# Patient Record
Sex: FEMALE | Race: OTHER | Employment: UNEMPLOYED
[De-identification: names, ages, dates, MRNs, and addresses within clinical notes are randomized per-mention and may not be internally consistent; named-entity substitution may affect disease eponyms.]

---

## 2024-07-23 ENCOUNTER — APPOINTMENT (OUTPATIENT)
Facility: HOSPITAL | Age: 29
End: 2024-07-23

## 2024-07-23 ENCOUNTER — HOSPITAL ENCOUNTER (EMERGENCY)
Facility: HOSPITAL | Age: 29
Discharge: HOME | End: 2024-07-23
Attending: EMERGENCY MEDICINE

## 2024-07-23 VITALS
DIASTOLIC BLOOD PRESSURE: 72 MMHG | RESPIRATION RATE: 16 BRPM | TEMPERATURE: 98.5 F | WEIGHT: 182.1 LBS | OXYGEN SATURATION: 100 % | SYSTOLIC BLOOD PRESSURE: 124 MMHG | HEART RATE: 88 BPM

## 2024-07-23 DIAGNOSIS — Z3A.01 7 WEEKS GESTATION OF PREGNANCY: Primary | ICD-10-CM

## 2024-07-23 DIAGNOSIS — R82.71 ASYMPTOMATIC BACTERIURIA: ICD-10-CM

## 2024-07-23 DIAGNOSIS — R11.0 NAUSEA: ICD-10-CM

## 2024-07-23 LAB
ALBUMIN SERPL-MCNC: 4 G/DL (ref 3.5–5)
ALBUMIN/GLOB SERPL: 0.9 (ref 1.1–2.2)
ALP SERPL-CCNC: 98 U/L (ref 45–117)
ALT SERPL-CCNC: 20 U/L (ref 12–78)
ANION GAP SERPL CALC-SCNC: 7 MMOL/L (ref 5–15)
APPEARANCE UR: ABNORMAL
AST SERPL-CCNC: 19 U/L (ref 15–37)
BACTERIA URNS QL MICRO: ABNORMAL /HPF
BASOPHILS # BLD: 0.1 K/UL (ref 0–0.1)
BASOPHILS NFR BLD: 1 % (ref 0–1)
BILIRUB SERPL-MCNC: 0.8 MG/DL (ref 0.2–1)
BILIRUB UR QL CFM: NEGATIVE
BUN SERPL-MCNC: 12 MG/DL (ref 6–20)
BUN/CREAT SERPL: 24 (ref 12–20)
CALCIUM SERPL-MCNC: 10 MG/DL (ref 8.5–10.1)
CHLORIDE SERPL-SCNC: 103 MMOL/L (ref 97–108)
CO2 SERPL-SCNC: 23 MMOL/L (ref 21–32)
COLOR UR: ABNORMAL
COMMENT:: NORMAL
CREAT SERPL-MCNC: 0.49 MG/DL (ref 0.55–1.02)
CRL: 1.02 CM
CRL: 1.06 CM
CRL: 1.08 CM
CRL: 1.11 CM
CRL: 1.12 CM
CRL: 1.14 CM
CRL: 1.26 CM
CRL: 1.4 CM
DIFFERENTIAL METHOD BLD: ABNORMAL
EOSINOPHIL # BLD: 0.1 K/UL (ref 0–0.4)
EOSINOPHIL NFR BLD: 1 % (ref 0–7)
EPITH CASTS URNS QL MICRO: ABNORMAL /LPF
ERYTHROCYTE [DISTWIDTH] IN BLOOD BY AUTOMATED COUNT: 14.4 % (ref 11.5–14.5)
FLUAV RNA SPEC QL NAA+PROBE: NOT DETECTED
FLUBV RNA SPEC QL NAA+PROBE: NOT DETECTED
GLOBULIN SER CALC-MCNC: 4.5 G/DL (ref 2–4)
GLUCOSE SERPL-MCNC: 116 MG/DL (ref 65–100)
GLUCOSE UR STRIP.AUTO-MCNC: NEGATIVE MG/DL
HCG SERPL-ACNC: ABNORMAL MIU/ML (ref 0–6)
HCG UR QL: POSITIVE
HCT VFR BLD AUTO: 39 % (ref 35–47)
HGB BLD-MCNC: 12.3 G/DL (ref 11.5–16)
HGB UR QL STRIP: NEGATIVE
IMM GRANULOCYTES # BLD AUTO: 0 K/UL (ref 0–0.04)
IMM GRANULOCYTES NFR BLD AUTO: 0 % (ref 0–0.5)
KETONES UR QL STRIP.AUTO: >80 MG/DL
LEUKOCYTE ESTERASE UR QL STRIP.AUTO: ABNORMAL
LIPASE SERPL-CCNC: 26 U/L (ref 13–75)
LYMPHOCYTES # BLD: 2.4 K/UL (ref 0.8–3.5)
LYMPHOCYTES NFR BLD: 23 % (ref 12–49)
MAGNESIUM SERPL-MCNC: 2.1 MG/DL (ref 1.6–2.4)
MCH RBC QN AUTO: 23.1 PG (ref 26–34)
MCHC RBC AUTO-ENTMCNC: 31.5 G/DL (ref 30–36.5)
MCV RBC AUTO: 73.2 FL (ref 80–99)
MONOCYTES # BLD: 0.6 K/UL (ref 0–1)
MONOCYTES NFR BLD: 6 % (ref 5–13)
MUCOUS THREADS URNS QL MICRO: ABNORMAL /LPF
NEUTS SEG # BLD: 7.2 K/UL (ref 1.8–8)
NEUTS SEG NFR BLD: 69 % (ref 32–75)
NITRITE UR QL STRIP.AUTO: NEGATIVE
NRBC # BLD: 0 K/UL (ref 0–0.01)
NRBC BLD-RTO: 0 PER 100 WBC
PH UR STRIP: 5.5 (ref 5–8)
PLATELET # BLD AUTO: 332 K/UL (ref 150–400)
PMV BLD AUTO: 11.4 FL (ref 8.9–12.9)
POTASSIUM SERPL-SCNC: 3.8 MMOL/L (ref 3.5–5.1)
PROT SERPL-MCNC: 8.5 G/DL (ref 6.4–8.2)
PROT UR STRIP-MCNC: 30 MG/DL
RBC # BLD AUTO: 5.33 M/UL (ref 3.8–5.2)
RBC #/AREA URNS HPF: ABNORMAL /HPF (ref 0–5)
SAC DIAMETER: 2.71 CM
SAC DIAMETER: 2.72 CM
SAC DIAMETER: 2.72 CM
SARS-COV-2 RNA RESP QL NAA+PROBE: NOT DETECTED
SODIUM SERPL-SCNC: 133 MMOL/L (ref 136–145)
SP GR UR REFRACTOMETRY: >1.03
SPECIMEN HOLD: NORMAL
SPECIMEN HOLD: NORMAL
UROBILINOGEN UR QL STRIP.AUTO: 1 EU/DL (ref 0.2–1)
WBC # BLD AUTO: 10.4 K/UL (ref 3.6–11)
WBC URNS QL MICRO: ABNORMAL /HPF (ref 0–4)

## 2024-07-23 PROCEDURE — 99284 EMERGENCY DEPT VISIT MOD MDM: CPT

## 2024-07-23 PROCEDURE — 96374 THER/PROPH/DIAG INJ IV PUSH: CPT

## 2024-07-23 PROCEDURE — 76817 TRANSVAGINAL US OBSTETRIC: CPT

## 2024-07-23 PROCEDURE — 76801 OB US < 14 WKS SINGLE FETUS: CPT

## 2024-07-23 PROCEDURE — 84702 CHORIONIC GONADOTROPIN TEST: CPT

## 2024-07-23 PROCEDURE — 80053 COMPREHEN METABOLIC PANEL: CPT

## 2024-07-23 PROCEDURE — 83735 ASSAY OF MAGNESIUM: CPT

## 2024-07-23 PROCEDURE — 83690 ASSAY OF LIPASE: CPT

## 2024-07-23 PROCEDURE — 87086 URINE CULTURE/COLONY COUNT: CPT

## 2024-07-23 PROCEDURE — 2580000003 HC RX 258

## 2024-07-23 PROCEDURE — 85025 COMPLETE CBC W/AUTO DIFF WBC: CPT

## 2024-07-23 PROCEDURE — 96361 HYDRATE IV INFUSION ADD-ON: CPT

## 2024-07-23 PROCEDURE — 36415 COLL VENOUS BLD VENIPUNCTURE: CPT

## 2024-07-23 PROCEDURE — 6360000002 HC RX W HCPCS

## 2024-07-23 PROCEDURE — 81025 URINE PREGNANCY TEST: CPT

## 2024-07-23 PROCEDURE — 81001 URINALYSIS AUTO W/SCOPE: CPT

## 2024-07-23 PROCEDURE — 87636 SARSCOV2 & INF A&B AMP PRB: CPT

## 2024-07-23 RX ORDER — ONDANSETRON 4 MG/1
4 TABLET, ORALLY DISINTEGRATING ORAL 3 TIMES DAILY PRN
Qty: 21 TABLET | Refills: 0 | Status: SHIPPED | OUTPATIENT
Start: 2024-07-23

## 2024-07-23 RX ORDER — ONDANSETRON 4 MG/1
4 TABLET, ORALLY DISINTEGRATING ORAL 3 TIMES DAILY PRN
Qty: 21 TABLET | Refills: 0 | Status: CANCELLED | OUTPATIENT
Start: 2024-07-23

## 2024-07-23 RX ORDER — 0.9 % SODIUM CHLORIDE 0.9 %
1000 INTRAVENOUS SOLUTION INTRAVENOUS ONCE
Status: COMPLETED | OUTPATIENT
Start: 2024-07-23 | End: 2024-07-23

## 2024-07-23 RX ORDER — CEPHALEXIN 500 MG/1
500 CAPSULE ORAL 3 TIMES DAILY
Qty: 21 CAPSULE | Refills: 0 | Status: CANCELLED | OUTPATIENT
Start: 2024-07-23 | End: 2024-07-30

## 2024-07-23 RX ORDER — CEPHALEXIN 500 MG/1
500 CAPSULE ORAL 3 TIMES DAILY
Qty: 21 CAPSULE | Refills: 0 | Status: SHIPPED | OUTPATIENT
Start: 2024-07-23 | End: 2024-07-30

## 2024-07-23 RX ORDER — ONDANSETRON 2 MG/ML
4 INJECTION INTRAMUSCULAR; INTRAVENOUS ONCE
Status: COMPLETED | OUTPATIENT
Start: 2024-07-23 | End: 2024-07-23

## 2024-07-23 RX ORDER — KETOROLAC TROMETHAMINE 30 MG/ML
15 INJECTION, SOLUTION INTRAMUSCULAR; INTRAVENOUS ONCE
Status: DISCONTINUED | OUTPATIENT
Start: 2024-07-23 | End: 2024-07-23

## 2024-07-23 RX ADMIN — ONDANSETRON 4 MG: 2 INJECTION INTRAMUSCULAR; INTRAVENOUS at 10:31

## 2024-07-23 RX ADMIN — SODIUM CHLORIDE 1000 ML: 9 INJECTION, SOLUTION INTRAVENOUS at 10:30

## 2024-07-23 ASSESSMENT — ENCOUNTER SYMPTOMS
NAUSEA: 1
VOMITING: 1

## 2024-07-23 NOTE — ED TRIAGE NOTES
Triage done with [facility]- pt c/o fever, vomiting and inability to get out of bed x 3 days, subjective fever, +abd pain, denies diarrhea or constipation , stated she may be pregnant

## 2024-07-23 NOTE — DISCHARGE INSTRUCTIONS
You were seen today in the emergency department for abdominal pain and nausea.  Your pregnancy test was positive in the emergency department.  According to the ultrasound imaging obtained today you are approximately 7 weeks gestation.  Your urine also showed evidence of bacteria.  Due to you being pregnant you should take the antibiotic as prescribed to treat the bacteria in your urine.  You need to follow-up closely with an OB/GYN for further evaluation and ongoing care during your pregnancy.  Return to the emergency department for any new or worsening symptoms.

## 2024-07-23 NOTE — ED PROVIDER NOTES
[id number]  [phone]    Schedule an appointment as soon as possible for a visit       [facility] EMERGENCY DEP  [address] [facility] [address]  [phone]  Go to   If symptoms worsen      DISCHARGE MEDICATIONS:  Discharge Medication List as of 7/23/2024  1:40 PM        START taking these medications    Details   cephALEXin (KEFLEX) 500 MG capsule Take 1 capsule by mouth 3 times daily for 7 days, Disp-21 capsule, R-0Print      ondansetron (ZOFRAN-ODT) 4 MG disintegrating tablet Take 1 tablet by mouth 3 times daily as needed for Nausea or Vomiting, Disp-21 tablet, R-0Print               (Please note that portions of this note were completed with a voice recognition program.  Efforts were made to edit the dictations but occasionally words are mis-transcribed.)    Bertha Wayne PA-C (electronically signed)  Emergency Attending Physician / Physician Assistant / Nurse Practitioner             Bertha Wayne PA-C  07/23/24 3807

## 2024-07-24 LAB
BACTERIA SPEC CULT: NORMAL
CC UR VC: NORMAL
SERVICE CMNT-IMP: NORMAL

## 2024-08-02 ENCOUNTER — PROCEDURE VISIT (OUTPATIENT)
Age: 29
End: 2024-08-02

## 2024-08-02 ENCOUNTER — INITIAL PRENATAL (OUTPATIENT)
Age: 29
End: 2024-08-02

## 2024-08-02 VITALS — WEIGHT: 173 LBS

## 2024-08-02 DIAGNOSIS — Z34.01 ENCOUNTER FOR SUPERVISION OF NORMAL FIRST PREGNANCY IN FIRST TRIMESTER: Primary | ICD-10-CM

## 2024-08-02 DIAGNOSIS — O36.80X0 ULTRASOUND SCAN TO CONFIRM FETAL VIABILITY WITH HISTORY OF MISCARRIAGE: Primary | ICD-10-CM

## 2024-08-02 DIAGNOSIS — Z34.90 PREGNANCY, UNSPECIFIED GESTATIONAL AGE: ICD-10-CM

## 2024-08-02 DIAGNOSIS — Z87.59 ULTRASOUND SCAN TO CONFIRM FETAL VIABILITY WITH HISTORY OF MISCARRIAGE: Primary | ICD-10-CM

## 2024-08-02 RX ORDER — ONDANSETRON 4 MG/1
4 TABLET, ORALLY DISINTEGRATING ORAL 3 TIMES DAILY PRN
Qty: 20 TABLET | Refills: 3 | Status: SHIPPED | OUTPATIENT
Start: 2024-08-02

## 2024-08-02 RX ORDER — PROMETHAZINE HYDROCHLORIDE 25 MG/1
25 SUPPOSITORY RECTAL EVERY 6 HOURS PRN
Qty: 20 SUPPOSITORY | Refills: 2 | Status: SHIPPED | OUTPATIENT
Start: 2024-08-02

## 2024-08-02 NOTE — PROGRESS NOTES
Initial Prenatal Note    CC: Amenorrhea, positive pregnancy test    HPI:  Bri Trent is a 29 y.o.  who presents for initial prenatal appointment. She was breastfeeding and was unsure she was pregnant until she went to the hospital due to nausea and vomiting. She has experienced hyperemesis. She denies dysuria, discharge, vaginal bleeding.    LMP history:  The date of her LMP is 23.  Her last menstrual period was normal.     Based on her LMP, her EDC is 2025 and her EGA is 9 weeks,1 days.     Ultrasound data:  She had an  ultrasound done by the ultrasound tech today which revealed a viable lopez pregnancy with a gestational age of 9 weeks and 1 days giving an EDC of 2025.  TA ULTRASOUND PERFORMED A SINGLE VIABLE 9W1D WITH BASIL OF 2025 IUP IS SEEN WITH NORMAL CARDIAC RHYTHM. GESTATIONAL AGE BASED ON TODAY'S ULTRASOUND. A NORMAL YOLK SAC IS SEEN. RIGHT OVARY NOT VISUALIZED DUE TO BOWEL/GAS. RIGHT ADDNEXA APPEARS WNL. LEFT OVARY APPEARS WNL. NO FREE FLUID IS SEEN IN THE CDS.   She is dated by US.    Relevant past pregnancy history:  She has the following pregnancy history: 3 c sections   She does not history of  delivery.    Relevant past medical history:   Noncontributory    Last Pap: unsure    Relevant social history:       1. Have you been to the ER, urgent care clinic, or hospitalized since your last visit? Yes, ER due to hyperemesis    2. Have you seen or consulted any other health care providers outside of the [facility] since your last visit? No    She declines  a chaperone during the gynecologic exam today.      Mini Holm LPN  
IUP IS SEEN WITH NORMAL CARDIAC RHYTHM. GESTATIONAL AGE BASED ON TODAY'S ULTRASOUND. A NORMAL YOLK SAC IS SEEN. RIGHT OVARY NOT VISUALIZED DUE TO BOWEL/GAS. RIGHT ADDNEXA APPEARS WNL. LEFT OVARY APPEARS WNL. NO FREE FLUID IS SEEN IN THE CDS.     I personally reviewed all US images as well as the above report in detail today edited as necessary.    Physical Exam:     Constitutional  Appearance: well-nourished, well developed, alert, in no acute distress    HENT  Head  Face: appears normal  Eyes: appear normal  Ears: normal  Mouth: normal  Lips: no lesions      Chest  Respiratory Effort: breathing unlabored     Cardiovascular  Heart:  Auscultation: regular rate and rhythm without murmur      Gastrointestinal  Abdominal Examination: abdomen non-tender to palpation, normal bowel sounds, no masses present  Liver and spleen: no hepatomegaly present, spleen not palpable  Hernias: no hernias identified    Genitourinary  deferred    Skin  General Inspection: no rash, no lesions identified    Neurologic/Psychiatric  Mental Status:  Orientation: grossly oriented to person, place and time  Mood and Affect: mood normal, affect appropriate    Assessment:   Intrauterine pregnancy with issues addressed in problem list  hyperemesis  Plan:   Recommend zofran and phenergan; encouraged ER for IVF; will set up home nursing/ zofran pump Monday; pepcid encouraged  Patient declines presence of chaperone during today's visit.   Offered CF testing, CVS, Nuchal Translucency, MSAFP, amnio, and discussed NIPT  Course of pregnancy discussed including visit schedule, routine U/S, glucola testing, etc.  Avoid alcoholic beverages and illicit/recreational drugs use  Take prenatal vitamins or folic acid daily.  Hospital and practice style discussed with coverage system.  Discussed nutrition, toxoplasmosis precautions, sexual activity, exercise, need for influenza vaccine, environmental and work hazards, travel advice, screen for domestic violence,

## 2024-08-03 LAB
BACTERIA SPEC CULT: NORMAL
SERVICE CMNT-IMP: NORMAL

## 2024-08-06 LAB
C TRACH RRNA SPEC QL NAA+PROBE: NEGATIVE
N GONORRHOEA RRNA SPEC QL NAA+PROBE: NEGATIVE
SPECIMEN SOURCE: NORMAL
T VAGINALIS RRNA SPEC QL NAA+PROBE: NEGATIVE

## 2024-08-23 ENCOUNTER — ROUTINE PRENATAL (OUTPATIENT)
Age: 29
End: 2024-08-23

## 2024-08-23 VITALS — WEIGHT: 175 LBS | DIASTOLIC BLOOD PRESSURE: 64 MMHG | SYSTOLIC BLOOD PRESSURE: 108 MMHG

## 2024-08-23 DIAGNOSIS — Z34.90 PREGNANCY, UNSPECIFIED GESTATIONAL AGE: Primary | ICD-10-CM

## 2024-08-23 NOTE — PROGRESS NOTES
The patient reports ongoing daily dizziness, when changing position and not; admits to mainly drinking pepsi; cautioned regarding this and reviewed need for good po intake     IOB labs today  Desires Panorama/Hor[employer] today  Urine cx/ urine gc,ch,tv done last visit

## 2024-08-24 LAB
ABO + RH BLD: NORMAL
BLOOD BANK CMNT PATIENT-IMP: NORMAL
BLOOD GROUP ANTIBODIES SERPL: NORMAL
ERYTHROCYTE [DISTWIDTH] IN BLOOD BY AUTOMATED COUNT: 15 % (ref 11.5–14.5)
HBV SURFACE AG SER QL: <0.1 INDEX
HBV SURFACE AG SER QL: NEGATIVE
HCT VFR BLD AUTO: 38.3 % (ref 35–47)
HCV AB SER IA-ACNC: 0.05 INDEX
HCV AB SERPL QL IA: NONREACTIVE
HGB BLD-MCNC: 12 G/DL (ref 11.5–16)
HIV 1+2 AB+HIV1 P24 AG SERPL QL IA: NONREACTIVE
HIV 1/2 RESULT COMMENT: NORMAL
MCH RBC QN AUTO: 23.5 PG (ref 26–34)
MCHC RBC AUTO-ENTMCNC: 31.3 G/DL (ref 30–36.5)
MCV RBC AUTO: 75.1 FL (ref 80–99)
NRBC # BLD: 0 K/UL (ref 0–0.01)
NRBC BLD-RTO: 0 PER 100 WBC
PLATELET # BLD AUTO: 315 K/UL (ref 150–400)
PMV BLD AUTO: 12.8 FL (ref 8.9–12.9)
RBC # BLD AUTO: 5.1 M/UL (ref 3.8–5.2)
RUBV IGG SERPL IA-ACNC: NORMAL IU/ML
SPECIMEN EXP DATE BLD: NORMAL
WBC # BLD AUTO: 10.4 K/UL (ref 3.6–11)

## 2024-08-26 LAB
T PALLIDUM AB SER QL IA: NON REACTIVE
VZV IGG SER IA-ACNC: 184 INDEX

## 2024-08-27 LAB
HGB A MFR BLD: 97.3 % (ref 96.4–98.8)
HGB A2 MFR BLD COLUMN CHROM: 2.7 % (ref 1.8–3.2)
HGB F MFR BLD: 0 % (ref 0–2)
HGB FRACT BLD-IMP: NORMAL
HGB S MFR BLD: 0 %

## 2024-09-02 LAB
Lab: NEGATIVE
Lab: NORMAL
NTRA CYSTIC FIBROSIS: NEGATIVE
NTRA DUCHENNE/BECKER MUSCULAR DYSTROPHY: NEGATIVE
NTRA FRAGILE X SYNDROME: NEGATIVE
NTRA SPINAL MUSCULAR ATROPHY: NEGATIVE

## 2024-09-03 ENCOUNTER — TELEPHONE (OUTPATIENT)
Age: 29
End: 2024-09-03

## 2024-09-03 NOTE — TELEPHONE ENCOUNTER
Patient presented to the office today. Lab work reviewed with patient in the office. Patient has no questions at this time.

## 2024-09-03 NOTE — TELEPHONE ENCOUNTER
Per Dr. Garibay- attempted to reach patient today to review lab work results. Patients phone number is going straight to voicemail and her voicemail is not set up to be able to leave a message. If patient calls back, please review her most recent lab work results.     Blue sticky note updated for Dr. Garibay to update the problem list.

## 2024-09-17 ENCOUNTER — ROUTINE PRENATAL (OUTPATIENT)
Age: 29
End: 2024-09-17

## 2024-09-17 VITALS
OXYGEN SATURATION: 99 % | BODY MASS INDEX: 28.99 KG/M2 | WEIGHT: 174 LBS | HEART RATE: 95 BPM | SYSTOLIC BLOOD PRESSURE: 109 MMHG | DIASTOLIC BLOOD PRESSURE: 74 MMHG | HEIGHT: 65 IN

## 2024-09-17 DIAGNOSIS — Z34.01 ENCOUNTER FOR SUPERVISION OF NORMAL FIRST PREGNANCY IN FIRST TRIMESTER: ICD-10-CM

## 2024-09-17 DIAGNOSIS — Z34.90 PREGNANCY, UNSPECIFIED GESTATIONAL AGE: Primary | ICD-10-CM

## 2024-09-17 PROCEDURE — 0502F SUBSEQUENT PRENATAL CARE: CPT | Performed by: OBSTETRICS & GYNECOLOGY

## 2024-09-17 RX ORDER — ONDANSETRON 4 MG/1
4 TABLET, ORALLY DISINTEGRATING ORAL 3 TIMES DAILY PRN
Qty: 20 TABLET | Refills: 3 | Status: SHIPPED | OUTPATIENT
Start: 2024-09-17

## 2024-09-21 LAB
AFP INTERP SERPL-IMP: NORMAL
AFP MOM SERPL: 0.83
AFP SERPL-MCNC: 24.1 NG/ML
AGE AT DELIVERY: 29.9 YR
AGE OF EGG DONOR: NO
AGE OF EGG DONOR: NORMAL
COMMENT: NORMAL
DONOR EGG?: 5
DONOR EGG?: NO
FAMILY HISTORY NTD: NORMAL
FHX NTD (Y OR N): NORMAL
GA METHOD: NORMAL
GA: 15.7 WEEKS
IDDM PATIENT QL: NO
INSULIN DEP. DIABETIC: 2784
Lab: 174
Lab: NORMAL
Lab: NORMAL
MAT SCN FOR FETAL ABNORMALITIES SERPL: NORMAL
MULTIPLE PREGNANCY: NO
NEURAL TUBE DEFECT RISK FETUS: NORMAL
NUMBER OF FETUSES: NO
OTHER INDICATIONS: NO
OTHER INDICATIONS: NORMAL
PREVIOUSLY ELEVATED AFP (Y OR N): 15
PREVIOUSLY ELEVATED AFP (Y OR N): NO
PRIOR 1ST TRIM TESTING ?: NORMAL
PRIOR 2ND TRIM TESTING ?: NORMAL
PRIOR DS/NTD SCREEN CURRENT PREGNANCY?: NORMAL
PRIOR DS/NTD SCREEN CURRENT PREGNANCY?: NORMAL
PRIOR FIRST TRIMESTER TESTING (Y OR N ): NORMAL
PRIOR PREGNANCY WITH DOWN SYNDROME (Y OR N): 1
PRIOR PREGNANCY WITH DOWN SYNDROME (Y OR N): NORMAL
TYPE OF EGG DONOR: NO
TYPE OF EGG DONOR: NORMAL

## 2024-10-14 ENCOUNTER — TELEPHONE (OUTPATIENT)
Age: 29
End: 2024-10-14

## 2024-10-14 DIAGNOSIS — Z34.90 PREGNANCY, UNSPECIFIED GESTATIONAL AGE: Primary | ICD-10-CM

## 2024-10-14 NOTE — PROGRESS NOTES
Verbal order obtained from Leonila Garibay MD for anatomy scan  and a diagnosis of normal pregnancy. Order read back to MD. Orders signed by this writer and sent for co-sign to MD.

## 2024-10-15 ENCOUNTER — ROUTINE PRENATAL (OUTPATIENT)
Age: 29
End: 2024-10-15

## 2024-10-15 VITALS
BODY MASS INDEX: 30.36 KG/M2 | DIASTOLIC BLOOD PRESSURE: 71 MMHG | WEIGHT: 180 LBS | HEART RATE: 89 BPM | SYSTOLIC BLOOD PRESSURE: 110 MMHG

## 2024-10-15 DIAGNOSIS — Z34.90 PREGNANCY, UNSPECIFIED GESTATIONAL AGE: Primary | ICD-10-CM

## 2024-10-15 PROCEDURE — 0502F SUBSEQUENT PRENATAL CARE: CPT | Performed by: OBSTETRICS & GYNECOLOGY

## 2024-11-12 ENCOUNTER — ROUTINE PRENATAL (OUTPATIENT)
Age: 29
End: 2024-11-12

## 2024-11-12 VITALS
WEIGHT: 186 LBS | OXYGEN SATURATION: 98 % | BODY MASS INDEX: 31.37 KG/M2 | SYSTOLIC BLOOD PRESSURE: 125 MMHG | HEART RATE: 96 BPM | DIASTOLIC BLOOD PRESSURE: 80 MMHG

## 2024-11-12 DIAGNOSIS — Z34.82 PRENATAL CARE, SUBSEQUENT PREGNANCY IN SECOND TRIMESTER: Primary | ICD-10-CM

## 2024-11-12 DIAGNOSIS — Z34.90 PREGNANCY, UNSPECIFIED GESTATIONAL AGE: ICD-10-CM

## 2024-11-12 DIAGNOSIS — Z34.82 PRENATAL CARE, SUBSEQUENT PREGNANCY IN SECOND TRIMESTER: ICD-10-CM

## 2024-11-12 LAB — GLUCOSE 1H P 100 G GLC PO SERPL-MCNC: 165 MG/DL (ref 65–140)

## 2024-11-12 PROCEDURE — 0502F SUBSEQUENT PRENATAL CARE: CPT | Performed by: OBSTETRICS & GYNECOLOGY

## 2024-11-12 NOTE — PROGRESS NOTES
Using trusted   Couple share that she was diabetic in last pregnancy  Glucola now; reviewed management if abnormal testing

## 2024-11-15 ENCOUNTER — TELEPHONE (OUTPATIENT)
Age: 29
End: 2024-11-15

## 2024-11-15 NOTE — TELEPHONE ENCOUNTER
I have attempted to contact the pt in regards to her most recent labs. Phone rung one time and there was a message that the voice mail had not been set up.     With the  we attempted this call twice. Will call her again later today.

## 2024-12-06 ENCOUNTER — TELEPHONE (OUTPATIENT)
Age: 29
End: 2024-12-06

## 2024-12-06 DIAGNOSIS — Z34.90 PREGNANCY, UNSPECIFIED GESTATIONAL AGE: Primary | ICD-10-CM

## 2024-12-06 NOTE — TELEPHONE ENCOUNTER
Name and  verified. Patient and  notified regarding results. Patient presented with both options from provider and advised would like to do 3 hr glucola. Patient educated on glucola instructions. Patient scheduled for lab with MD follow-up during visit.

## 2024-12-17 ENCOUNTER — ROUTINE PRENATAL (OUTPATIENT)
Age: 29
End: 2024-12-17
Payer: MEDICAID

## 2024-12-17 ENCOUNTER — LAB (OUTPATIENT)
Age: 29
End: 2024-12-17

## 2024-12-17 VITALS
DIASTOLIC BLOOD PRESSURE: 82 MMHG | HEART RATE: 101 BPM | BODY MASS INDEX: 31.54 KG/M2 | WEIGHT: 187 LBS | SYSTOLIC BLOOD PRESSURE: 126 MMHG

## 2024-12-17 DIAGNOSIS — Z34.82 PRENATAL CARE, SUBSEQUENT PREGNANCY IN SECOND TRIMESTER: Primary | ICD-10-CM

## 2024-12-17 DIAGNOSIS — Z34.90 PREGNANCY, UNSPECIFIED GESTATIONAL AGE: ICD-10-CM

## 2024-12-17 LAB
GESTATIONAL 3HR GTT: ABNORMAL
GLUCOSE 1H P 100 G GLC PO SERPL-MCNC: 201 MG/DL (ref 65–180)
GLUCOSE 2 HOUR: 184 MG/DL (ref 65–155)
GLUCOSE P FAST SERPL-MCNC: 109 MG/DL (ref 65–95)
GLUCOSE, 3 HOUR: 126 MG/DL (ref 65–140)

## 2024-12-17 PROCEDURE — 0502F SUBSEQUENT PRENATAL CARE: CPT | Performed by: OBSTETRICS & GYNECOLOGY

## 2024-12-17 PROCEDURE — 90715 TDAP VACCINE 7 YRS/> IM: CPT | Performed by: OBSTETRICS & GYNECOLOGY

## 2024-12-17 PROCEDURE — 90471 IMMUNIZATION ADMIN: CPT | Performed by: OBSTETRICS & GYNECOLOGY

## 2024-12-17 NOTE — PROGRESS NOTES
Using  phone  Doing well; focused on my transition and getting her established with Dr Mosquera  Having her 3hr GTT; very challenging to reach her for earlier evaluation

## 2024-12-18 ENCOUNTER — TELEPHONE (OUTPATIENT)
Age: 29
End: 2024-12-18

## 2025-01-08 ENCOUNTER — ROUTINE PRENATAL (OUTPATIENT)
Age: 30
End: 2025-01-08
Payer: MEDICAID

## 2025-01-08 VITALS
DIASTOLIC BLOOD PRESSURE: 80 MMHG | BODY MASS INDEX: 31.71 KG/M2 | HEART RATE: 100 BPM | WEIGHT: 188 LBS | SYSTOLIC BLOOD PRESSURE: 115 MMHG

## 2025-01-08 DIAGNOSIS — O24.410 DIET CONTROLLED GESTATIONAL DIABETES MELLITUS (GDM), ANTEPARTUM: ICD-10-CM

## 2025-01-08 DIAGNOSIS — Z34.82 PRENATAL CARE, SUBSEQUENT PREGNANCY IN SECOND TRIMESTER: Primary | ICD-10-CM

## 2025-01-08 DIAGNOSIS — Z34.90 PREGNANCY, UNSPECIFIED GESTATIONAL AGE: ICD-10-CM

## 2025-01-08 PROCEDURE — 59025 FETAL NON-STRESS TEST: CPT | Performed by: OBSTETRICS & GYNECOLOGY

## 2025-01-08 PROCEDURE — 0502F SUBSEQUENT PRENATAL CARE: CPT | Performed by: OBSTETRICS & GYNECOLOGY

## 2025-01-08 NOTE — PROGRESS NOTES
Able to use  to review gestational DM; was diabetic in last pregnancy; reviewed accucheck parameters; MFM/ diab counseling appt set for   Decreased FM for past several days  Will be transferring to Dr Mosquera  NST procedure note    Bri Trent is a ,  29 y.o. female Other whose No LMP recorded. Patient is pregnant.  was on  who presents for fetal non-stress test.    She is 31w6d and was monitored for 25 minutes and the FHR was reactive, with accelerations.    NST Interpretation:    FHR baseline 140 bpm, variability moderate, accelerations present, decelerations Absent.    Uterine contractions were absent.    Bri was informed of the NST results and her questions were answered.

## 2025-01-13 ENCOUNTER — ROUTINE PRENATAL (OUTPATIENT)
Age: 30
End: 2025-01-13
Payer: MEDICAID

## 2025-01-13 DIAGNOSIS — O24.419 GESTATIONAL DIABETES MELLITUS (GDM) IN THIRD TRIMESTER, GESTATIONAL DIABETES METHOD OF CONTROL UNSPECIFIED: Primary | ICD-10-CM

## 2025-01-13 DIAGNOSIS — Z86.32 HISTORY OF GESTATIONAL DIABETES IN PRIOR PREGNANCY, CURRENTLY PREGNANT IN THIRD TRIMESTER: ICD-10-CM

## 2025-01-13 DIAGNOSIS — O09.293 HISTORY OF GESTATIONAL DIABETES IN PRIOR PREGNANCY, CURRENTLY PREGNANT IN THIRD TRIMESTER: ICD-10-CM

## 2025-01-13 DIAGNOSIS — O24.419 GDM (GESTATIONAL DIABETES MELLITUS): ICD-10-CM

## 2025-01-13 PROCEDURE — 99214 OFFICE O/P EST MOD 30 MIN: CPT

## 2025-01-13 RX ORDER — GLUCOSAMINE HCL/CHONDROITIN SU 500-400 MG
CAPSULE ORAL
Qty: 200 STRIP | Refills: 3 | Status: SHIPPED | OUTPATIENT
Start: 2025-01-13

## 2025-01-13 RX ORDER — BLOOD-GLUCOSE METER
EACH MISCELLANEOUS
Qty: 1 KIT | Refills: 0 | Status: SHIPPED | OUTPATIENT
Start: 2025-01-13

## 2025-01-13 RX ORDER — PEN NEEDLE, DIABETIC 31 GX5/16"
NEEDLE, DISPOSABLE MISCELLANEOUS
Qty: 200 EACH | Refills: 3 | Status: SHIPPED | OUTPATIENT
Start: 2025-01-13

## 2025-01-13 RX ORDER — LANCETS 30 GAUGE
EACH MISCELLANEOUS
Qty: 200 EACH | Refills: 3 | Status: SHIPPED | OUTPATIENT
Start: 2025-01-13

## 2025-01-13 NOTE — PROGRESS NOTES
Assessment & Plan   ASSESSMENT/PLAN:  1. Gestational diabetes mellitus (GDM) in third trimester, gestational diabetes method of control unspecified  2. History of gestational diabetes in prior pregnancy, currently pregnant in third trimester    Bri is a 30 y/o seen today for a new diagnosis of GDM.    Priest Noel Thomas Translated for the patient.     New GDM/Hx GDM A2  -24: 1 hr gtt: fail 165; 24: 3 hr gtt: fail 109 201 184 126  -We reviewed patient's new diagnosis of gestational diabetes. Discussion with the patient included an overview of gestational diabetes, methods of management, monitoring and surveillance,  implications (i.e., macrosomia as well as increased risk of the patient developing diabetes in the future).  We discussed the importance of good glycemia control and risks of uncontrolled diabetes including but not limited to LGA, polyhydramnios,  labor, and fetal death. Packet with DM education material, blood sugar logs provided to patient.  -We discussed risks/benefits of insulin vs oral agents including unknown long-term effects to the fetus with oral agents and the rare side effect of lactic acidosis with metformin. All questions answered, patient voiced understanding and is amenable to starting insulin if blood sugar is not controlled with diet.   -New GDM education provided. See below.   -Declined diabetes ed. Reports well versed with GDM meal planning.  -Submit logs weekly.   -Kick count instructions, PIH and PTL precautions reviewed.   -Pt aware of MFM U/S appt scheduled     Subjective   Bri Trent (:  1995) is a 29 y.o. female,New patient, here for evaluation of the following chief complaint(s):  1. Gestational diabetes mellitus (GDM) in third trimester, gestational diabetes method of control unspecified  2. History of gestational diabetes in prior pregnancy, currently pregnant in third trimester    Patient has been newly diagnosed with Gestational Diabetes

## 2025-01-23 ENCOUNTER — ROUTINE PRENATAL (OUTPATIENT)
Age: 30
End: 2025-01-23
Payer: MEDICAID

## 2025-01-23 VITALS — DIASTOLIC BLOOD PRESSURE: 63 MMHG | SYSTOLIC BLOOD PRESSURE: 109 MMHG | HEART RATE: 96 BPM

## 2025-01-23 DIAGNOSIS — Z3A.34 34 WEEKS GESTATION OF PREGNANCY: Primary | ICD-10-CM

## 2025-01-23 PROCEDURE — 76811 OB US DETAILED SNGL FETUS: CPT | Performed by: STUDENT IN AN ORGANIZED HEALTH CARE EDUCATION/TRAINING PROGRAM

## 2025-01-23 RX ORDER — INSULIN HUMAN 100 [IU]/ML
INJECTION, SUSPENSION SUBCUTANEOUS
Qty: 5 ADJUSTABLE DOSE PRE-FILLED PEN SYRINGE | Refills: 3 | Status: SHIPPED | OUTPATIENT
Start: 2025-01-23

## 2025-01-23 RX ORDER — UBIQUINOL 100 MG
CAPSULE ORAL
Qty: 200 EACH | Refills: 5 | Status: SHIPPED | OUTPATIENT
Start: 2025-01-23

## 2025-01-23 RX ORDER — INSULIN LISPRO 100 [IU]/ML
INJECTION, SOLUTION INTRAVENOUS; SUBCUTANEOUS
Qty: 5 ADJUSTABLE DOSE PRE-FILLED PEN SYRINGE | Refills: 3 | Status: SHIPPED | OUTPATIENT
Start: 2025-01-23

## 2025-01-23 NOTE — PROGRESS NOTES
Please look under media to view full consult and ultrasound report in ViewPoint.     Bri Trent , was evaluated through a synchronous (real-time) audio encounter.     The patient (or guardian if applicable) is aware that this is a billable service, which includes applicable co-pays. This Virtual Visit was conducted with patient's (and/or legal guardian's) consent. Patient identification was verified, and a caregiver was present when appropriate.     This patient was located at home home [address] [facility] [address].     Provider was located at facility [address] [place] [address].     Confirm you are appropriately licensed, registered, or certified to deliver care in the state where the patient is located as indicated above. If you are not or unsure, please re-schedule the visit: Yes, I confirm.    Yanna Lal MD   Maternal Fetal Medicine

## 2025-01-23 NOTE — PROGRESS NOTES
NP note 1/23/25    Patient declined hospital  services and utilized her  , name Maggi Liang.    Log review with Bri Trent and counseled on initiation of insulin.  Dr. Lal to discuss Anatomy scan.  Please see Viewpoint for ultrasound findings.      Gestational Diabetes: A2  - Previously counseled. See prior notes.   - Recommend diabetes educator consult   - Reviewed importance of BG log and submitting each week to monitor BG (Patient to submit via mychart or in person visit)   - Recommend serial growth q4 weeks from diagnosis with MFM   - Weekly ANT starting at 32 weeks with primary OB   - Delivery between 39.0-39.6 (If poorly controlled with LGA/Poly consider delivery at 37.0-38.6)      Log reviewed: Globally elevated. Denies hypoglycemic episodes.   - NP reviewed risks/benefits of insulin vs oral agents including unknown long-term effects to the fetus with oral agents and the rare side effect of lactic acidosis with metformin. All questions answered, patient voiced understanding and amenable to starting insulin.   - Current regimen: Diet   - New Regimen: NPH 17/13 Lispro 13/0/13  - Insulin administration education provided by KAYDEN.     Sarah Sheth, TRU-BC

## 2025-01-23 NOTE — PROGRESS NOTES
Reviewed insulin administration with patient and patient verbalized and demonstrated understanding. Reviewed signs/symptoms of hypoglycemia. Patient declined hospital  services and utilized her  , angel Liang.

## 2025-01-23 NOTE — PROGRESS NOTES
Transfer from [facility]  She opts to have Father Noel  for her  GDM- insulin prescribed but hasn't started it yet.  She reports a fasting blood sugar of 120  Isn't taking pnv, doesn't feel well after taking it  Tentative RLTCS 3/3 10am; not posted yet per PL.  Per PL desires bilat salping, ethics consult?  Saw M 1/23  +FM  Some 'stiffness' and 'severe' hip and pubic pain  Denies any ctx yet  Her , 3 yo daughter and Father Noel present with her to today's visit  Nanda Barrera LPN  1/24/2025  8:52 AM

## 2025-01-23 NOTE — PROCEDURES
PATIENT: JANAY VELASCO   -  : 1995   -  DOS:2025   -  INTERPRETING PROVIDER:Yanna Lal,   Indication  ========    New GDM    Method  ======    Transabdominal ultrasound examination. View: suboptimal due to advanced gestational age    Dating  ======    Previous Ultrasound on: 2024  Type of prior assessment: GA  GA at prior assessment date 9 w + 0 d  GA by previous U/S 34 w + 0 d  BASIL by previous Ultrasound: 3/6/2025  Ultrasound examination on: 2025  GA by U/S based upon: AC, BPD, Femur, HC  GA by U/S 35 w + 4 d  BASIL by U/S: 2025  Assigned: based on ultrasound (GA), selected on 2025  Assigned GA 34 w + 0 d  Assigned BASIL: 3/6/2025    Fetal Growth Overview  =================    Exam date        GA              BPD (mm)          HC (mm)              AC (mm)               FL (mm)             HL (mm)            EFW (g)  2025        34w 0d        86.2     70%        329.9    93%        323.3     96%        65.3    31%        55.7     17%        2709    85%    Fetal Biometry  ============    Standard  BPD 86.2 mm 34w 5d 70% Hadlock  .7 mm -/- >99% Kenn  .9 mm 37w 4d 93% Hadlock  Cerebellum tr 47.0 mm 35w 3d 59% Hill  .3 mm 36w 2d 96% Hadlock  Femur 65.3 mm 33w 5d 31% Hadlock  Humerus 55.7 mm 32w 3d 17% Kenn  EFW 2,709 g 35w 4d 85% Hadlock  EFW (lb) 6 lb  EFW (oz) 0 oz  EFW by: Hadlock (BPD-HC-AC-FL)  Extended   5.0 mm  CM 8.0 mm  66% Nicolaides  Nasal bone 11.1 mm  Head / Face / Neck  Nasal bone: present  Other Structures   bpm    General Evaluation  ==============    Cardiac activity present.  bpm. Fetal movements: visualized. Presentation: BREECH  Placenta: Placental site: posterior, appropriate distance from the internal os. Placental edge-to-cervical os distance 14.6 cm  Umbilical cord: Cord vessels: 3 vessel cord. Insertion site: central  Amniotic fluid: Amount of AF: normal. MVP 6.2 cm. SHANTAL 22.9 cm. Q1 6.1 cm, Q2 5.0 cm, Q3 5.5 cm, Q4 6.2

## 2025-01-24 ENCOUNTER — ROUTINE PRENATAL (OUTPATIENT)
Age: 30
End: 2025-01-24

## 2025-01-24 VITALS
DIASTOLIC BLOOD PRESSURE: 73 MMHG | BODY MASS INDEX: 32.15 KG/M2 | TEMPERATURE: 97.9 F | HEART RATE: 111 BPM | OXYGEN SATURATION: 98 % | HEIGHT: 65 IN | WEIGHT: 193 LBS | RESPIRATION RATE: 17 BRPM | SYSTOLIC BLOOD PRESSURE: 113 MMHG

## 2025-01-24 DIAGNOSIS — Z34.90 PREGNANCY, UNSPECIFIED GESTATIONAL AGE: Primary | ICD-10-CM

## 2025-01-24 PROCEDURE — 0502F SUBSEQUENT PRENATAL CARE: CPT | Performed by: OBSTETRICS & GYNECOLOGY

## 2025-01-24 ASSESSMENT — PATIENT HEALTH QUESTIONNAIRE - PHQ9
SUM OF ALL RESPONSES TO PHQ QUESTIONS 1-9: 0
2. FEELING DOWN, DEPRESSED OR HOPELESS: NOT AT ALL
SUM OF ALL RESPONSES TO PHQ QUESTIONS 1-9: 0
SUM OF ALL RESPONSES TO PHQ9 QUESTIONS 1 & 2: 0
1. LITTLE INTEREST OR PLEASURE IN DOING THINGS: NOT AT ALL

## 2025-01-24 NOTE — PROGRESS NOTES
Transfer from [facility]  She opts to have Father Noel  for her  GDM- insulin prescribed but hasn't started it yet.  She reports a fasting blood sugar of 120 this morning. Most fastings and postprandials significantly elevated, highs in 200s. Strongly encouraged her to start insulin, discussed LGA and risks for delivery/  Isn't taking pnv, doesn't feel well after taking it  RLTCS and bilat salpingectomy requested 25, ethics consulted, response pending  Saw MFM   +FM, no LOF or VB or ctx  Some 'stiffness' and 'severe' hip and pubic pain/groin pain, discussed pregnancy support belt  Her , 3 yo daughter and Father Noel present with her to today's visit    RTC 2 wks    Lilli Mosquera MD  2025  9:26 AM

## 2025-01-30 ENCOUNTER — ROUTINE PRENATAL (OUTPATIENT)
Age: 30
End: 2025-01-30
Payer: MEDICAID

## 2025-01-30 VITALS — DIASTOLIC BLOOD PRESSURE: 69 MMHG | SYSTOLIC BLOOD PRESSURE: 115 MMHG

## 2025-01-30 DIAGNOSIS — Z98.891 HISTORY OF C-SECTION: ICD-10-CM

## 2025-01-30 DIAGNOSIS — O24.419 GDM, CLASS A2: Primary | ICD-10-CM

## 2025-01-30 DIAGNOSIS — O24.419 GDM, CLASS A2: ICD-10-CM

## 2025-01-30 DIAGNOSIS — O24.419 GESTATIONAL DIABETES MELLITUS (GDM) IN THIRD TRIMESTER, GESTATIONAL DIABETES METHOD OF CONTROL UNSPECIFIED: Primary | ICD-10-CM

## 2025-01-30 PROCEDURE — 76815 OB US LIMITED FETUS(S): CPT | Performed by: STUDENT IN AN ORGANIZED HEALTH CARE EDUCATION/TRAINING PROGRAM

## 2025-01-30 PROCEDURE — 99214 OFFICE O/P EST MOD 30 MIN: CPT

## 2025-01-30 PROCEDURE — 76819 FETAL BIOPHYS PROFIL W/O NST: CPT | Performed by: STUDENT IN AN ORGANIZED HEALTH CARE EDUCATION/TRAINING PROGRAM

## 2025-01-30 NOTE — PROCEDURES
PATIENT: JANAY VELASCO   -  : 1995   -  DOS:2025   -  INTERPRETING PROVIDER:Yanna Lal,   Indication  ========    A2DM (non compliant)    Method  ======    Transabdominal ultrasound examination. View: suboptimal due to advanced gestational age and unfavorable fetal position    Pregnancy  =========    Bailon pregnancy. Number of fetuses: 1    Dating  ======    Previous Ultrasound on: 2024  Type of prior assessment: GA  GA at prior assessment date 9 w + 0 d  GA by previous U/S 35 w + 0 d  BASIL by previous Ultrasound: 3/6/2025  Assigned: based on ultrasound (GA), selected on 2025  Assigned GA 35 w + 0 d  Assigned BASIL: 3/6/2025    General Evaluation  ==============    Cardiac activity present.  bpm. Fetal movements: visualized. Presentation: Cephalic  Placenta: Placental site: posterior, appropriate distance from the internal os  Umbilical cord: Cord vessels: 3 vessel cord    Fetal Anatomy  ===========    Face  Palate: SUBOPTIMAL  Maxilla: SUBOPTIMAL  4-chamber view: normal  RVOT view: normal  LVOT view: normal  3-vessel-trachea view: normal  Heart / Thorax  Aortic arch view: NOT VISUALIZED  Ductal arch view: normal  Interventricular septum: normal  Stomach: normal  Kidneys: normal  Bladder: normal  Genitals: normal  Abdomen  Rt renal artery: normal  Lt renal artery: normal  Cervical spine: normal  Thoracic spine: normal  Lumbar spine: normal  Sacral spine: SUBOPTIMAL  Rt arm: SUBOPTIMAL  Lt arm: SUBOPTIMAL  Rt forearm: not visualized  Rt hand: normal  Rt fingers: normal  Lt forearm: suboptimal  Lt hand: normal  Lt fingers: normal  Rt leg: normal  Lt leg: normal  Rt foot: normal  Rt toes: normal  Lt foot: normal  Lt toes: normal  Wants to know fetal sex: yes    Amniotic Fluid Assessment  =====================    Amount of AF: normal, normal  MVP 5.8 cm. SHANTAL 19.8 cm. Q1 3.5 cm, Q2 5.7 cm, Q3 4.9 cm, Q4 5.8 cm    Biophysical Profile  ==============    2: Fetal breathing movements  2:

## 2025-01-30 NOTE — PROGRESS NOTES
Assessment & Plan   ASSESSMENT/PLAN:  1. GDM, class A2  2. History of     BRI is 29 yrs of age,  seen for a pregnancy complicated by:      utilized: Mara #[id number]     Gestational Diabetes: A2, poorly controlled  - Previously counseled. See prior notes.   - Counseled again today on the importance of euglycemia in pregnancy and the risk of still birth.   - Diabetes education provided on    - Reviewed importance of BG log and submitting each week to monitor BG (Patient to submit via 1Casthart or in person visit)   - Recommend serial growth q4 weeks from diagnosis with MFM   - Weekly ANT increased to twice weekly  - Delivery 37.0 (poorly controlled)  - Log reviewed: Glucose globally elevated. F 114-130, 2hr pp 140-200. Pt reports she is not taking her insulin as prescribed, missing several doses and is eating increased sweet treats.  - Discussed eliminating sweet treats from her diet and resuming current insulin dose: Insulin Regimen: NPH  Lispro    - Kick count instructions, PIH and PTL precautions reviewed.      Hx of CSx3   - posterior placenta, no previa  - no signs of PAS noted today      Labs reviewed:   NIPT: normal female (dick)  CF/SMA/DMD/FXS neg (dick )  msAFP neg  Hemo elec WNL    Recommendations  - Resume insulin Regimen: NPH  Lispro    - F/U 4 days to commence twice weekly ANT     - ANT twice weekly due to poorly starting at 32 weeks and serial growth scans   - Delivery 37.0 due to poorly controlled GDM A2     Patient images have been reviewed. Agree with the plan of care as outlined above.   Yanna Lal MD   Maternal Fetal Medicine     Please see Viewpoint for ultrasound findings.     Subjective   Bri Trent (:  1995) is a 29 y.o. female,Established patient, here for evaluation of the following chief complaint(s):  1. GDM, class A2  2. History of     Objective   Physical Exam  Vitals reviewed.   Constitutional:

## 2025-01-30 NOTE — PROGRESS NOTES
Patient was seen 1/30/2025      Please look under media to view full consult and ultrasound report in ViewPoint.         Yanna Lal MD   Maternal Fetal Medicine

## 2025-02-03 ENCOUNTER — ROUTINE PRENATAL (OUTPATIENT)
Age: 30
End: 2025-02-03
Payer: MEDICAID

## 2025-02-03 ENCOUNTER — HOSPITAL ENCOUNTER (INPATIENT)
Facility: HOSPITAL | Age: 30
LOS: 2 days | Discharge: HOME | DRG: 566 | End: 2025-02-06
Attending: OBSTETRICS & GYNECOLOGY | Admitting: OBSTETRICS & GYNECOLOGY
Payer: MEDICAID

## 2025-02-03 ENCOUNTER — TELEPHONE (OUTPATIENT)
Age: 30
End: 2025-02-03

## 2025-02-03 VITALS — SYSTOLIC BLOOD PRESSURE: 124 MMHG | DIASTOLIC BLOOD PRESSURE: 78 MMHG

## 2025-02-03 DIAGNOSIS — O24.419 GDM, CLASS A2: ICD-10-CM

## 2025-02-03 DIAGNOSIS — R07.9 CHEST PAIN, UNSPECIFIED TYPE: Primary | ICD-10-CM

## 2025-02-03 DIAGNOSIS — O24.419 GDM, CLASS A2: Primary | ICD-10-CM

## 2025-02-03 DIAGNOSIS — Z98.891 HISTORY OF C-SECTION: ICD-10-CM

## 2025-02-03 DIAGNOSIS — Z3A.35 35 WEEKS GESTATION OF PREGNANCY: Primary | ICD-10-CM

## 2025-02-03 LAB
GLUCOSE BLD STRIP.AUTO-MCNC: 198 MG/DL (ref 65–117)
SERVICE CMNT-IMP: ABNORMAL

## 2025-02-03 PROCEDURE — 99212 OFFICE O/P EST SF 10 MIN: CPT | Performed by: STUDENT IN AN ORGANIZED HEALTH CARE EDUCATION/TRAINING PROGRAM

## 2025-02-03 PROCEDURE — 99222 1ST HOSP IP/OBS MODERATE 55: CPT | Performed by: OBSTETRICS & GYNECOLOGY

## 2025-02-03 PROCEDURE — 76819 FETAL BIOPHYS PROFIL W/O NST: CPT | Performed by: STUDENT IN AN ORGANIZED HEALTH CARE EDUCATION/TRAINING PROGRAM

## 2025-02-03 PROCEDURE — 6370000000 HC RX 637 (ALT 250 FOR IP): Performed by: OBSTETRICS & GYNECOLOGY

## 2025-02-03 PROCEDURE — 82962 GLUCOSE BLOOD TEST: CPT

## 2025-02-03 PROCEDURE — 76816 OB US FOLLOW-UP PER FETUS: CPT | Performed by: STUDENT IN AN ORGANIZED HEALTH CARE EDUCATION/TRAINING PROGRAM

## 2025-02-03 PROCEDURE — G0378 HOSPITAL OBSERVATION PER HR: HCPCS | Performed by: OBSTETRICS & GYNECOLOGY

## 2025-02-03 PROCEDURE — G0378 HOSPITAL OBSERVATION PER HR: HCPCS

## 2025-02-03 PROCEDURE — 59025 FETAL NON-STRESS TEST: CPT | Performed by: STUDENT IN AN ORGANIZED HEALTH CARE EDUCATION/TRAINING PROGRAM

## 2025-02-03 PROCEDURE — 99214 OFFICE O/P EST MOD 30 MIN: CPT

## 2025-02-03 PROCEDURE — 59025 FETAL NON-STRESS TEST: CPT

## 2025-02-03 RX ORDER — INSULIN LISPRO 100 [IU]/ML
6 INJECTION, SOLUTION INTRAVENOUS; SUBCUTANEOUS 2 TIMES DAILY WITH MEALS
Status: DISCONTINUED | OUTPATIENT
Start: 2025-02-03 | End: 2025-02-03

## 2025-02-03 RX ORDER — INSULIN LISPRO 100 [IU]/ML
0-4 INJECTION, SOLUTION INTRAVENOUS; SUBCUTANEOUS
Status: DISCONTINUED | OUTPATIENT
Start: 2025-02-03 | End: 2025-02-04

## 2025-02-03 RX ORDER — FAMOTIDINE 20 MG/1
20 TABLET, FILM COATED ORAL 2 TIMES DAILY
Status: DISCONTINUED | OUTPATIENT
Start: 2025-02-03 | End: 2025-02-06 | Stop reason: HOSPADM

## 2025-02-03 RX ORDER — ONDANSETRON 4 MG/1
4 TABLET, ORALLY DISINTEGRATING ORAL EVERY 8 HOURS PRN
Status: DISCONTINUED | OUTPATIENT
Start: 2025-02-03 | End: 2025-02-06 | Stop reason: HOSPADM

## 2025-02-03 RX ORDER — ACETAMINOPHEN 500 MG
1000 TABLET ORAL EVERY 8 HOURS SCHEDULED
Status: DISCONTINUED | OUTPATIENT
Start: 2025-02-03 | End: 2025-02-06 | Stop reason: HOSPADM

## 2025-02-03 RX ORDER — DEXTROSE MONOHYDRATE 100 MG/ML
INJECTION, SOLUTION INTRAVENOUS CONTINUOUS PRN
Status: DISCONTINUED | OUTPATIENT
Start: 2025-02-03 | End: 2025-02-06 | Stop reason: HOSPADM

## 2025-02-03 RX ORDER — ONDANSETRON 2 MG/ML
4 INJECTION INTRAMUSCULAR; INTRAVENOUS EVERY 6 HOURS PRN
Status: DISCONTINUED | OUTPATIENT
Start: 2025-02-03 | End: 2025-02-06 | Stop reason: HOSPADM

## 2025-02-03 RX ORDER — ZOLPIDEM TARTRATE 5 MG/1
5 TABLET ORAL NIGHTLY PRN
Status: DISCONTINUED | OUTPATIENT
Start: 2025-02-03 | End: 2025-02-06 | Stop reason: HOSPADM

## 2025-02-03 RX ADMIN — FAMOTIDINE 20 MG: 20 TABLET, FILM COATED ORAL at 22:17

## 2025-02-03 RX ADMIN — INSULIN LISPRO 1 UNITS: 100 INJECTION, SOLUTION INTRAVENOUS; SUBCUTANEOUS at 22:17

## 2025-02-03 RX ADMIN — INSULIN HUMAN 20 UNITS: 100 INJECTION, SUSPENSION SUBCUTANEOUS at 22:12

## 2025-02-03 NOTE — PROGRESS NOTES
12:26 PM. Patient BG is abnormal. Patient reports she is not taking insulin because she cannot afford and does not have insurance. On file the patient is showing that she has active Medicaid. Medicaid #[id number]. Called pharmacy and spoke to Phillip. He said he will try to run the insulin through her insurance and to call back in 15 minutes.     1250[facility]     Medicaid number called. Was able to verify patient insurance is active. However, she only qualifies for emergency coverage only - per automated system. Notified FARSHAD Sheth.     1320- report called to Antepartum charge nurse.     1325- report called to transfer center [phone].

## 2025-02-03 NOTE — PROGRESS NOTES
Please see ultrasound report under Imaging tab or Media tab.    Of note, patient not taking insulin and in agreement with note/plan for admission for glucose control.   Tessy Lemons MD  [facility]

## 2025-02-03 NOTE — PROGRESS NOTES
Patient arrived to [facility] ambulatory as a direct admit from [facility] office.     Unable to get patient admitted into system as patient went to the ED first where they admitted her downstairs.    1816- Called registration to have patient discharged from ED so patient can be admitted to [facility]     1825- Patient in system at this time     1827- Perfect served Dr. Vásquez to notify of patient's arrival.

## 2025-02-03 NOTE — TELEPHONE ENCOUNTER
Received a call from Dr. Palomo Vásquez as he was expecting this pt from [facility] for admission to L&D for monitoring of blood sugars.    Pt was to arrive 3 hours ago and they have not seen her. I have called all numbers on file using an  with no answer or option for VM.     I have called Dr. Vásquez to make him aware.

## 2025-02-03 NOTE — PROCEDURES
PATIENT: JANAY VELASCO   -  : 1995   -  DOS:2025   -  INTERPRETING PROVIDER:Tessy Lemons,   Indication  ========    A2DM (non compliant)    Method  ======    Transabdominal ultrasound examination. External Fetal Monitor. View: Sufficient    Pregnancy  =========    Bailon pregnancy. Number of fetuses: 1    Dating  ======    Previous Ultrasound on: 2024  Type of prior assessment: GA  GA at prior assessment date 9 w + 0 d  GA by previous U/S 35 w + 4 d  BASIL by previous Ultrasound: 3/6/2025  Assigned: based on ultrasound (GA), selected on 2025  Assigned GA 35 w + 4 d  Assigned BASIL: 3/6/2025    General Evaluation  ==============    Cardiac activity present.  bpm. Fetal movements: visualized. Presentation: Cephalic  Placenta: Placental site: posterior, appropriate distance from the internal os  Umbilical cord: Cord vessels: 3 vessel cord  Amniotic fluid: Amount of AF: normal. MVP 5.0 cm. SHANTAL 17.0 cm. Q1 4.1 cm, Q2 5.0 cm, Q3 4.0 cm, Q4 3.8 cm    Fetal Anatomy  ===========    Face  Palate: normal  Maxilla: SUBOPTIMAL  Heart / Thorax  Aortic arch view: normal  Stomach: normal  Kidneys: normal  Bladder: normal  Sacral spine: normal  Rt arm: normal  Lt arm: normal  Rt forearm: normal  Lt forearm: normal  Wants to know fetal sex: yes    Non Stress Test  =============    NST interpretation: reactive. Test duration 20 min. Baseline  bpm. Baseline variability: moderate. Accelerations: present. Decelerations: absent. Uterine activity:  absent. Acoustic stimulation: no    Findings  =======    Intrauterine Bailon pregnancy at 35w 4d by clinical dates.  Anatomy visualized as stated above.  Amniotic fluid is normal.  Placenta is posterior, appropriate distance from the internal os.  Cephalic presentation.  The structures that were visualized appear to be normal within the limitations of ultrasound technology, however fetal anatomy remains incomplete. Today, the remaining  structures are

## 2025-02-03 NOTE — PROGRESS NOTES
Assessment & Plan   ASSESSMENT/PLAN:  1. GDM, class A2  2. History of     JANAY is 29 yrs of age,  seen for a pregnancy complicated by:     Reassuring BPP/NST 10/10 and normal SHANTAL today.       utilized: Loc #[id number]     Gestational Diabetes: A2, poorly controlled (non-compliant)  - Previously counseled. See prior notes.   - Counseled again today on the importance of euglycemia in pregnancy and the risk of still birth. Explained \"still birth\" means that there is an increased risk that the baby could die if her blood sugars remains poorly controlled.   - Diabetes education provided on    - Reviewed importance of BG log and submitting each week to monitor BG (Patient to submit via [email or web address] or in person visit)   - Recommend serial growth q4 weeks from diagnosis with [facility]   - Weekly ANT increased to twice weekly  - Delivery 37.0 (poorly controlled)  - Log reviewed: Glucose globally elevated. F 128-145 , 2hr pp 144-345. Pt reports that she has not received her insulin. RN called pharmacy, they report her Medicaid only covers emergencies and dialysis. Recommend case management consult.   - Current insulin dose prescribed: NPH  Lispro    - Kick count instructions, PIH and PTL precautions reviewed.   - Recommend admitting for endocrine consult and glycemic control. Dr. Vásquez notified. RN called report to L&D and transfer center.   - Pt ambulated from [facility] with  and child, in no distress. Reports going to [facility] to be admitted for glycemic control.     Hx of CSx3   - posterior placenta, no previa  - no signs of PAS noted today      Labs reviewed:   NIPT: normal female (dick)  CF/SMA/DMD/FXS neg (dick )  msAFP neg  Hemo elec WNL    Recommendations  Report to [facility] L&D to be admitted for endocrine consult, glycemic control and case management consult.      - Prescribed insulin Regimen: NPH 13 Lispro  (pt not taking)     - Continue twice weekly

## 2025-02-04 PROBLEM — E11.65 TYPE 2 DIABETES MELLITUS WITH HYPERGLYCEMIA (HCC): Status: ACTIVE | Noted: 2025-02-04

## 2025-02-04 PROBLEM — O24.113 PRE-EXISTING TYPE 2 DIABETES MELLITUS DURING PREGNANCY IN THIRD TRIMESTER: Status: ACTIVE | Noted: 2025-02-04

## 2025-02-04 LAB
GLUCOSE BLD STRIP.AUTO-MCNC: 109 MG/DL (ref 65–117)
GLUCOSE BLD STRIP.AUTO-MCNC: 120 MG/DL (ref 65–117)
GLUCOSE BLD STRIP.AUTO-MCNC: 124 MG/DL (ref 65–117)
GLUCOSE BLD STRIP.AUTO-MCNC: 157 MG/DL (ref 65–117)
GLUCOSE BLD STRIP.AUTO-MCNC: 163 MG/DL (ref 65–117)
SERVICE CMNT-IMP: ABNORMAL
SERVICE CMNT-IMP: NORMAL

## 2025-02-04 PROCEDURE — G0378 HOSPITAL OBSERVATION PER HR: HCPCS

## 2025-02-04 PROCEDURE — 6370000000 HC RX 637 (ALT 250 FOR IP): Performed by: CLINICAL NURSE SPECIALIST

## 2025-02-04 PROCEDURE — 82962 GLUCOSE BLOOD TEST: CPT

## 2025-02-04 PROCEDURE — 99232 SBSQ HOSP IP/OBS MODERATE 35: CPT | Performed by: OBSTETRICS & GYNECOLOGY

## 2025-02-04 PROCEDURE — 99221 1ST HOSP IP/OBS SF/LOW 40: CPT | Performed by: CLINICAL NURSE SPECIALIST

## 2025-02-04 PROCEDURE — 6370000000 HC RX 637 (ALT 250 FOR IP): Performed by: OBSTETRICS & GYNECOLOGY

## 2025-02-04 PROCEDURE — 59025 FETAL NON-STRESS TEST: CPT

## 2025-02-04 PROCEDURE — 4A1HXCZ MONITORING OF PRODUCTS OF CONCEPTION, CARDIAC RATE, EXTERNAL APPROACH: ICD-10-PCS | Performed by: OBSTETRICS & GYNECOLOGY

## 2025-02-04 RX ORDER — INSULIN LISPRO 100 [IU]/ML
10 INJECTION, SOLUTION INTRAVENOUS; SUBCUTANEOUS
Status: DISCONTINUED | OUTPATIENT
Start: 2025-02-04 | End: 2025-02-05

## 2025-02-04 RX ORDER — INSULIN LISPRO 100 [IU]/ML
5 INJECTION, SOLUTION INTRAVENOUS; SUBCUTANEOUS
Status: DISCONTINUED | OUTPATIENT
Start: 2025-02-04 | End: 2025-02-04

## 2025-02-04 RX ORDER — INSULIN LISPRO 100 [IU]/ML
5 INJECTION, SOLUTION INTRAVENOUS; SUBCUTANEOUS ONCE
Status: DISCONTINUED | OUTPATIENT
Start: 2025-02-04 | End: 2025-02-04

## 2025-02-04 RX ADMIN — ACETAMINOPHEN 1000 MG: 500 TABLET ORAL at 22:04

## 2025-02-04 RX ADMIN — INSULIN LISPRO 10 UNITS: 100 INJECTION, SOLUTION INTRAVENOUS; SUBCUTANEOUS at 17:28

## 2025-02-04 RX ADMIN — INSULIN HUMAN 17 UNITS: 100 INJECTION, SUSPENSION SUBCUTANEOUS at 10:04

## 2025-02-04 RX ADMIN — FAMOTIDINE 20 MG: 20 TABLET, FILM COATED ORAL at 22:04

## 2025-02-04 RX ADMIN — INSULIN LISPRO 10 UNITS: 100 INJECTION, SOLUTION INTRAVENOUS; SUBCUTANEOUS at 10:06

## 2025-02-04 RX ADMIN — FAMOTIDINE 20 MG: 20 TABLET, FILM COATED ORAL at 08:27

## 2025-02-04 RX ADMIN — INSULIN HUMAN 24 UNITS: 100 INJECTION, SUSPENSION SUBCUTANEOUS at 22:03

## 2025-02-04 ASSESSMENT — PAIN DESCRIPTION - DESCRIPTORS: DESCRIPTORS: HEAVINESS;SORE

## 2025-02-04 ASSESSMENT — PAIN DESCRIPTION - LOCATION: LOCATION: PELVIS;LEG

## 2025-02-04 ASSESSMENT — PAIN SCALES - GENERAL: PAINLEVEL_OUTOF10: 9

## 2025-02-04 NOTE — PROGRESS NOTES
2005: Bedside and Verbal shift change report given to ACLLUM Tidwell RN (oncoming nurse) by DALY Agrawal RN (offgoing nurse). Report included the following information Nurse Handoff Report, Index, Adult Overview, Intake/Output, MAR, Recent Results, and Med Rec Status.

## 2025-02-04 NOTE — PROGRESS NOTES
Antepartum Progress Note    Bri Trent  35w5d    Assessment: 35w5d     Gestational diabetes, poorly controlled    Plan:   Gestational diabetes, poorly controlled  - Follow up Diabetic education consultation  - Follow up care management consult  - Continue insulin: NPH 20 units and sliding scale  - MFM recs: NPH  Lispro    - Continue NSTs BID    Routine OB  - History of 3 prior c/s- planned repeat with salpingectomy  - Posterior placenta  - Last growth scan on 25: EFW 2,709g, 6#  - Will need delivery at 37 weeks ()- Dr. Mosquera to schedule  - Normal female anatomy  - Low risk NIPT  - [x] TDAP administered 2024    Orders/Charges: Medium and Non Stress Test  X 2    Patient states she has no new complaints. Good FM. No VB, LOF.    Vitals:  /80   Pulse 94   Temp 98 °F (36.7 °C) (Oral)   Resp 16   SpO2 98%   Temp (24hrs), Av.5 °F (36.4 °C), Min:97 °F (36.1 °C), Max:98 °F (36.7 °C)      Last 24hr Input/Output:  No intake or output data in the 24 hours ending 25 0720     Non stress test:  Reactive  150s/mod jean-paul/pos accels/ no decels  [place]    No data found. No data found.     Exam:  Patient without distress.     Abdomen, fundus soft non-tender     Extremities, no redness or tenderness             Additional Exam: Deferred    Labs:     Lab Results   Component Value Date/Time    WBC 10.4 2024 01:48 PM    WBC 10.4 2024 10:20 AM    HGB 12.0 2024 01:48 PM    HGB 12.3 2024 10:20 AM    HCT 38.3 2024 01:48 PM    HCT 39.0 2024 10:20 AM     2024 01:48 PM     2024 10:20 AM       Recent Results (from the past 24 hour(s))   POCT Glucose    Collection Time: 25 10:04 PM   Result Value Ref Range    POC Glucose 198 (H) 65 - 117 mg/dL    Performed by: Savanna Payne    POCT Glucose    Collection Time: 25  6:08 AM   Result Value Ref Range    POC Glucose 124 (H) 65 - 117 mg/dL    Performed by: Savanna Payne       none

## 2025-02-04 NOTE — PROGRESS NOTES
Bedside and Verbal shift change report given to KAYDEN Linares  (oncoming nurse) by CALLUM Tidwell (offgoing nurse). Report included the following information Nurse Handoff Report, Index, Adult Overview, Intake/Output, MAR, and Recent Results.

## 2025-02-04 NOTE — H&P
History & Physical    Name: Bri Trent MRN: [id number]  SSN: [id number]    YOB: 1995  Age: 29 y.o.  Sex: female      Hungarian :  [id number]    Subjective:     Estimated Date of Delivery: 3/6/25  OB History          4    Para   3    Term   3            AB        Living   3         SAB        IAB        Ectopic        Molar        Multiple        Live Births   3                Ms. Trent is admitted with pregnancy at 35w4d for blood sugar control.. Pregnancy has been complicated by GDM and history of 3 previous c-sections. Pt was at [facility] today and her glucose log showed elevated fasting and postprandial readings. Pt was supposed to be on insulin but has not taken any int his pregnancy. She has been trying to control with diet and insurance was not willing to cover her insulin. +FM, no VB, no VD, no LOF, rare contraction.     Principal Problem:    Gestational diabetes mellitus (GDM) affecting pregnancy  Resolved Problems:    * No resolved hospital problems. *     Past Medical History:   Diagnosis Date    Hyperemesis      Past Surgical History:   Procedure Laterality Date    CHOLECYSTECTOMY, OPEN      2017     Social History     Occupational History    Not on file   Tobacco Use    Smoking status: Never    Smokeless tobacco: Never   Substance and Sexual Activity    Alcohol use: Never    Drug use: Never    Sexual activity: Not on file     No family history on file.    No Known Allergies  Prior to Admission medications    Medication Sig Start Date End Date Taking? Authorizing Provider   insulin lispro, 1 Unit Dial, (HUMALOG KWIKPEN) 100 UNIT/ML SOPN Inject 13 Units into the skin daily (with breakfast) AND 13 Units Daily with supper. Inject 13 units with breakfast and 13 units with dinner. Please substitute for insurance preferred brand..  Patient not taking: Reported on 2/3/2025 1/23/25   Yanna Lal MD   insulin NPH (HUMULIN N KWIKPEN) 100 UNIT/ML injection pen Inject 17 Units into the

## 2025-02-04 NOTE — PROGRESS NOTES
Bedside and Verbal shift change report given to UMAIR Oh RN  (oncoming nurse) by Milagros MONIQUE  (offgoing nurse). Report included the following information SBAR, Kardex, Intake/Output, MAR, and Recent Results.        used for handoff report and  assessment needs

## 2025-02-04 NOTE — CARE COORDINATION
CM consult placed for medication assistance at discharge. CM emailed [facility] staff to request assistance determining patient's Medicaid status. CM will continue to follow.    Julianne Burden [facility]  Care Management [facility]  [phone]    Please see note from patient. Only wants to speak to Dr Graham

## 2025-02-05 PROBLEM — R07.9 CHEST PAIN: Status: ACTIVE | Noted: 2025-02-05

## 2025-02-05 PROBLEM — O24.419 GESTATIONAL DIABETES: Status: ACTIVE | Noted: 2025-02-05

## 2025-02-05 LAB
EKG ATRIAL RATE: 96 BPM
EKG DIAGNOSIS: NORMAL
EKG P AXIS: 48 DEGREES
EKG P-R INTERVAL: 124 MS
EKG Q-T INTERVAL: 344 MS
EKG QRS DURATION: 82 MS
EKG QTC CALCULATION (BAZETT): 434 MS
EKG R AXIS: 0 DEGREES
EKG T AXIS: 24 DEGREES
EKG VENTRICULAR RATE: 96 BPM
ERYTHROCYTE [DISTWIDTH] IN BLOOD BY AUTOMATED COUNT: 15.7 % (ref 11.5–14.5)
GLUCOSE BLD STRIP.AUTO-MCNC: 108 MG/DL (ref 65–117)
GLUCOSE BLD STRIP.AUTO-MCNC: 111 MG/DL (ref 65–117)
GLUCOSE BLD STRIP.AUTO-MCNC: 92 MG/DL (ref 65–117)
GLUCOSE BLD STRIP.AUTO-MCNC: 99 MG/DL (ref 65–117)
HCT VFR BLD AUTO: 32.4 % (ref 35–47)
HGB BLD-MCNC: 10.3 G/DL (ref 11.5–16)
MCH RBC QN AUTO: 22.2 PG (ref 26–34)
MCHC RBC AUTO-ENTMCNC: 31.8 G/DL (ref 30–36.5)
MCV RBC AUTO: 69.7 FL (ref 80–99)
NRBC # BLD: 0 K/UL (ref 0–0.01)
NRBC BLD-RTO: 0 PER 100 WBC
PLATELET # BLD AUTO: 184 K/UL (ref 150–400)
RBC # BLD AUTO: 4.65 M/UL (ref 3.8–5.2)
SERVICE CMNT-IMP: NORMAL
TROPONIN I SERPL HS-MCNC: <4 NG/L (ref 0–51)
WBC # BLD AUTO: 10.7 K/UL (ref 3.6–11)

## 2025-02-05 PROCEDURE — 99222 1ST HOSP IP/OBS MODERATE 55: CPT | Performed by: SPECIALIST

## 2025-02-05 PROCEDURE — 85027 COMPLETE CBC AUTOMATED: CPT

## 2025-02-05 PROCEDURE — APPNB45 APP NON BILLABLE 31-45 MINUTES: Performed by: NURSE PRACTITIONER

## 2025-02-05 PROCEDURE — 36415 COLL VENOUS BLD VENIPUNCTURE: CPT

## 2025-02-05 PROCEDURE — 93010 ELECTROCARDIOGRAM REPORT: CPT | Performed by: SPECIALIST

## 2025-02-05 PROCEDURE — 99231 SBSQ HOSP IP/OBS SF/LOW 25: CPT | Performed by: OBSTETRICS & GYNECOLOGY

## 2025-02-05 PROCEDURE — 6370000000 HC RX 637 (ALT 250 FOR IP): Performed by: OBSTETRICS & GYNECOLOGY

## 2025-02-05 PROCEDURE — 93005 ELECTROCARDIOGRAM TRACING: CPT | Performed by: NURSE PRACTITIONER

## 2025-02-05 PROCEDURE — 84484 ASSAY OF TROPONIN QUANT: CPT

## 2025-02-05 PROCEDURE — 6370000000 HC RX 637 (ALT 250 FOR IP): Performed by: CLINICAL NURSE SPECIALIST

## 2025-02-05 PROCEDURE — 1120000000 HC RM PRIVATE OB

## 2025-02-05 PROCEDURE — 82962 GLUCOSE BLOOD TEST: CPT

## 2025-02-05 RX ORDER — INSULIN LISPRO 100 [IU]/ML
12 INJECTION, SOLUTION INTRAVENOUS; SUBCUTANEOUS
Status: DISCONTINUED | OUTPATIENT
Start: 2025-02-05 | End: 2025-02-06 | Stop reason: HOSPADM

## 2025-02-05 RX ADMIN — INSULIN LISPRO 12 UNITS: 100 INJECTION, SOLUTION INTRAVENOUS; SUBCUTANEOUS at 08:50

## 2025-02-05 RX ADMIN — INSULIN HUMAN 25 UNITS: 100 INJECTION, SUSPENSION SUBCUTANEOUS at 09:37

## 2025-02-05 RX ADMIN — FAMOTIDINE 20 MG: 20 TABLET, FILM COATED ORAL at 08:49

## 2025-02-05 RX ADMIN — ACETAMINOPHEN 1000 MG: 500 TABLET ORAL at 21:23

## 2025-02-05 RX ADMIN — FAMOTIDINE 20 MG: 20 TABLET, FILM COATED ORAL at 21:23

## 2025-02-05 RX ADMIN — INSULIN LISPRO 12 UNITS: 100 INJECTION, SOLUTION INTRAVENOUS; SUBCUTANEOUS at 17:16

## 2025-02-05 RX ADMIN — INSULIN HUMAN 16 UNITS: 100 INJECTION, SUSPENSION SUBCUTANEOUS at 21:23

## 2025-02-05 ASSESSMENT — PAIN SCALES - GENERAL: PAINLEVEL_OUTOF10: 0

## 2025-02-05 NOTE — PROGRESS NOTES
Bedside and Verbal shift change report given to KAYDEN Linares (oncoming nurse) by CALLUM Tidwell RN (offgoing nurse). Report included the following information Nurse Handoff Report, Index, Adult Overview, Intake/Output, MAR, and Recent Results.     0925: cardiology consult placed and called.      0945: Attempted NST at this time. Patient's toddler is in bed with patient. Discussed with patient that we would attempt NST when the toddler has supervision. Patient agreed.

## 2025-02-05 NOTE — PROGRESS NOTES
2120: Upon assessment of pt, pt admits to having \"heart pain\" and SOB. Pt assured pain was not heart burn or indigestion and asked if an EKG could be performed. VS WNL, lungs clear, and no SOB at time. This RN asked pt when did these reported symptoms started, pt states at the beginning of pregnancy. This RN also asked if pt notified her OB provider of these symptoms, pt states that she did and was directed to go to the ER for an EKG. Pt admits to not going to ER bc of not having insurance, but does have insurance now. Educated pt will report to night time provider. All communication performed via .    2129: Called [facility] to make aware of symptoms and pt request of EKG. [facility] unable to come to unit at this time, instructed pt to report symptoms to provider in AM during rounds if symptoms began during beginning of pregnancy.    2150: This RN made pt aware of notifying provider in AM. Pt without \"heart pain\" and SOB at the moment. Provided pt with tylenol for hip and leg pain. Educated pt to call out if pain worsens. All communication done via .

## 2025-02-05 NOTE — PROGRESS NOTES
1947: Bedside and Verbal shift change report given to CALLUM Tidwell RN (oncoming nurse) by UMAIR Oh RN (offgoing nurse). Report included the following information Nurse Handoff Report, Index, Adult Overview, Intake/Output, MAR, Recent Results, and Med Rec Status.

## 2025-02-05 NOTE — PROGRESS NOTES
Antepartum Progress Note    Croatian  # (Vidya) used for visit    Bri Trent  35w6d    Assessment:   Gestational diabetes, poorly controlled  Chest pain/SOB overnight 2/4-2/5    Plan:   Gestational diabetes, poorly controlled  - Diabetes management team following, appreciate assistance  - Case management consulted - they state that she can get insulin at [facility] for $48 that should get her through remainder of pregnancy  - Insulin: NPH 25/16 Lispro 12/0/12   - Continue NSTs BID    Chest pain and SOB overnight: currently asymptomatic but this has been recurrent throughout pregnancy   - cardiology consult   - pepcid bid prn    Routine OB  - History of 3 prior c/s- planned repeat with salpingectomy  - Posterior placenta  - Last growth scan on 1/23/25: EFW 2,709g, 6#  - Will need delivery at 37 weeks (2/13)- this has been requested  - Normal female anatomy  - Low risk NIPT  - [x] TDAP administered 12/17/2024    Orders/Charges: Medium and Non Stress Test     Subjective:  Overnight pt reports episode of chest pain, SOB and dizziness, which has since resolved. Reports these have been happening throughout her pregnancy. Denies any GERD/heartburn symptoms. Denies palpitations. Good FM. No VB, LOF.     Improved fasting BG value this morning, but sugars still not at goal.    Vitals:  Patient Vitals for the past 24 hrs:   BP Temp Temp src Pulse Resp SpO2   02/05/25 0845 109/81 98.1 °F (36.7 °C) Oral 90 14 98 %   02/05/25 0640 117/77 97.3 °F (36.3 °C) Oral 90 15 99 %   02/04/25 2129 120/74 97.9 °F (36.6 °C) Oral 91 17 98 %   02/04/25 1605 112/70 97.7 °F (36.5 °C) Oral 91 16 99 %   02/04/25 1330 125/78 98.1 °F (36.7 °C) Oral (!) 102 16 97 %     Non stress test 2/4/25 PM:  Reactive  1135s/mod jean-paul/pos accels/ no decels  [place] no ctx    Non stress test 2/4/25 AM: pending    Exam:  Patient without distress.     Abdomen, fundus soft non-tender     Extremities, no redness or tenderness             Labs:   Lab Results   Component

## 2025-02-06 ENCOUNTER — APPOINTMENT (OUTPATIENT)
Facility: HOSPITAL | Age: 30
DRG: 566 | End: 2025-02-06
Payer: MEDICAID

## 2025-02-06 VITALS
WEIGHT: 192.9 LBS | RESPIRATION RATE: 18 BRPM | BODY MASS INDEX: 32.14 KG/M2 | HEART RATE: 100 BPM | TEMPERATURE: 98.4 F | HEIGHT: 65 IN | DIASTOLIC BLOOD PRESSURE: 76 MMHG | OXYGEN SATURATION: 99 % | SYSTOLIC BLOOD PRESSURE: 115 MMHG

## 2025-02-06 LAB
APPEARANCE UR: CLEAR
BILIRUB UR QL: NEGATIVE
COLOR UR: NORMAL
ECHO AO ROOT DIAM: 3 CM
ECHO AO ROOT INDEX: 1.55 CM/M2
ECHO AV AREA PEAK VELOCITY: 3 CM2
ECHO AV AREA/BSA PEAK VELOCITY: 1.5 CM2/M2
ECHO AV PEAK GRADIENT: 9 MMHG
ECHO AV PEAK VELOCITY: 1.5 M/S
ECHO AV VELOCITY RATIO: 0.73
ECHO BSA: 2 M2
ECHO EST RA PRESSURE: 3 MMHG
ECHO LA DIAMETER INDEX: 1.96 CM/M2
ECHO LA DIAMETER: 3.8 CM
ECHO LA TO AORTIC ROOT RATIO: 1.27
ECHO LV E' SEPTAL VELOCITY: 9.28 CM/S
ECHO LV EF PHYSICIAN: 55 %
ECHO LV EJECTION FRACTION A2C: 46 %
ECHO LV EJECTION FRACTION A4C: 52 %
ECHO LV FRACTIONAL SHORTENING: 32 % (ref 28–44)
ECHO LV GLOBAL LONGITUDINAL STRAIN (GLS): -19.1 %
ECHO LV INTERNAL DIMENSION DIASTOLE INDEX: 2.58 CM/M2
ECHO LV INTERNAL DIMENSION DIASTOLIC: 5 CM (ref 3.9–5.3)
ECHO LV INTERNAL DIMENSION SYSTOLIC INDEX: 1.75 CM/M2
ECHO LV INTERNAL DIMENSION SYSTOLIC: 3.4 CM
ECHO LV IVSD: 0.6 CM (ref 0.6–0.9)
ECHO LV MASS 2D: 104.6 G (ref 67–162)
ECHO LV MASS INDEX 2D: 53.9 G/M2 (ref 43–95)
ECHO LV POSTERIOR WALL DIASTOLIC: 0.7 CM (ref 0.6–0.9)
ECHO LV RELATIVE WALL THICKNESS RATIO: 0.28
ECHO LVOT AREA: 4.2 CM2
ECHO LVOT DIAM: 2.3 CM
ECHO LVOT PEAK GRADIENT: 5 MMHG
ECHO LVOT PEAK VELOCITY: 1.1 M/S
ECHO MV A VELOCITY: 0.73 M/S
ECHO MV E VELOCITY: 1.05 M/S
ECHO MV E/A RATIO: 1.44
ECHO MV E/E' SEPTAL: 11.31
ECHO PV MAX VELOCITY: 1.4 M/S
ECHO PV PEAK GRADIENT: 8 MMHG
ECHO RIGHT VENTRICULAR SYSTOLIC PRESSURE (RVSP): 36 MMHG
ECHO RV FREE WALL PEAK S': 17.1 CM/S
ECHO RV TAPSE: 1.8 CM (ref 1.7–?)
ECHO TV REGURGITANT MAX VELOCITY: 2.87 M/S
ECHO TV REGURGITANT PEAK GRADIENT: 33 MMHG
GLUCOSE BLD STRIP.AUTO-MCNC: 121 MG/DL (ref 65–117)
GLUCOSE BLD STRIP.AUTO-MCNC: 122 MG/DL (ref 65–117)
GLUCOSE BLD STRIP.AUTO-MCNC: 126 MG/DL (ref 65–117)
GLUCOSE UR STRIP.AUTO-MCNC: NEGATIVE MG/DL
HGB UR QL STRIP: NEGATIVE
KETONES UR QL STRIP.AUTO: NEGATIVE MG/DL
LEUKOCYTE ESTERASE UR QL STRIP.AUTO: NEGATIVE
NITRITE UR QL STRIP.AUTO: NEGATIVE
PH UR STRIP: 5.5 (ref 5–8)
PROT UR STRIP-MCNC: NEGATIVE MG/DL
SERVICE CMNT-IMP: ABNORMAL
SP GR UR REFRACTOMETRY: 1.02 (ref 1–1.03)
UROBILINOGEN UR QL STRIP.AUTO: 0.2 EU/DL (ref 0.2–1)

## 2025-02-06 PROCEDURE — 82962 GLUCOSE BLOOD TEST: CPT

## 2025-02-06 PROCEDURE — 6370000000 HC RX 637 (ALT 250 FOR IP): Performed by: OBSTETRICS & GYNECOLOGY

## 2025-02-06 PROCEDURE — 59025 FETAL NON-STRESS TEST: CPT | Performed by: OBSTETRICS & GYNECOLOGY

## 2025-02-06 PROCEDURE — 59025 FETAL NON-STRESS TEST: CPT

## 2025-02-06 PROCEDURE — 99231 SBSQ HOSP IP/OBS SF/LOW 25: CPT | Performed by: OBSTETRICS & GYNECOLOGY

## 2025-02-06 PROCEDURE — 93306 TTE W/DOPPLER COMPLETE: CPT

## 2025-02-06 PROCEDURE — 81002 URINALYSIS NONAUTO W/O SCOPE: CPT

## 2025-02-06 PROCEDURE — 87086 URINE CULTURE/COLONY COUNT: CPT

## 2025-02-06 PROCEDURE — 6370000000 HC RX 637 (ALT 250 FOR IP): Performed by: CLINICAL NURSE SPECIALIST

## 2025-02-06 RX ORDER — INSULIN HUMAN 100 [IU]/ML
INJECTION, SUSPENSION SUBCUTANEOUS
Qty: 3 ADJUSTABLE DOSE PRE-FILLED PEN SYRINGE | Refills: 0 | Status: SHIPPED | OUTPATIENT
Start: 2025-02-06 | End: 2025-02-06

## 2025-02-06 RX ORDER — INSULIN HUMAN 100 [IU]/ML
INJECTION, SUSPENSION SUBCUTANEOUS
Qty: 3 ADJUSTABLE DOSE PRE-FILLED PEN SYRINGE | Refills: 0 | Status: SHIPPED | OUTPATIENT
Start: 2025-02-06

## 2025-02-06 RX ORDER — INSULIN LISPRO 100 [IU]/ML
INJECTION, SOLUTION INTRAVENOUS; SUBCUTANEOUS
Qty: 2 ADJUSTABLE DOSE PRE-FILLED PEN SYRINGE | Refills: 0 | Status: SHIPPED | OUTPATIENT
Start: 2025-02-06

## 2025-02-06 RX ADMIN — FAMOTIDINE 20 MG: 20 TABLET, FILM COATED ORAL at 09:38

## 2025-02-06 RX ADMIN — INSULIN HUMAN 25 UNITS: 100 INJECTION, SUSPENSION SUBCUTANEOUS at 09:39

## 2025-02-06 NOTE — PROGRESS NOTES
2/6/2025        RE: Bri Trent         [address]  [facility] [facility] [place] [address]          To Whom It May Concern,      Due to medical reasons, Bri Trent was hospitalized from 02/03/2025 to 02/06/2025. If you have any questions or concerns, please reach out to Addy PLUMMER at [phone]          Sincerely,          Martha Zeng RN

## 2025-02-06 NOTE — PROGRESS NOTES
Antepartum Progress Note    Chinese     Bri Trent  36w0d    Assessment:   Gestational diabetes  Chest pain/SOB, resolved    Plan: Discharge home today pending results of echo    Gestational diabetes, poorly controlled --> now with improved control on insulin  - Diabetes management team following, appreciate assistance  - Case management consulted   - Insulin plan for discharge: NPH 25/20 Lispro 12/0/12 (increase evening NPH due to persistent elevated fasting values)  - Continue NSTs BID    Chest pain and SOB overnight: currently asymptomatic but this has been recurrent throughout pregnancy   - cardiology consulted, appreciate assistance - EKG with normal sinus rhythm borderline LVH but no ischemic changes. Troponin neg.  - echo this AM, results pending  - pepcid bid prn    Routine OB  - History of 3 prior c/s- planned repeat with salpingectomy  - Posterior placenta  - Last growth scan on 1/23/25: EFW 2,709g, 6#  - Will need delivery at 37 weeks (2/13)- this has been requested  - Normal female anatomy  - Low risk NIPT  - [x] TDAP administered 12/17/2024    Orders/Charges: Medium and Non Stress Test       Subjective:  No acute events overnight. Feeling well this morning. No further CP or SOB. Good FM. No VB, LOF.     Yesterday, pt had 24 hrs of improved BG, this morning fasting elevated.    Vitals:  Patient Vitals for the past 24 hrs:   BP Temp Temp src Pulse Resp SpO2   02/06/25 0430 109/62 98 °F (36.7 °C) Oral 90 15 99 %   02/05/25 2127 112/73 98.1 °F (36.7 °C) Oral 95 16 98 %   02/05/25 1554 106/89 98 °F (36.7 °C) Oral 98 14 98 %   02/05/25 0845 109/81 98.1 °F (36.7 °C) Oral 90 14 98 %     Non stress test 2/5/25 PM:  Reactive  140s/mod jean-paul/pos accels/no decels  [place] irregular ctx    Non stress test 2/6/25 AM: pending    Exam:  Patient without distress.     Abdomen, fundus soft non-tender     Extremities, no redness or tenderness             Labs:   Lab Results   Component Value Date/Time    WBC 10.7

## 2025-02-06 NOTE — PROGRESS NOTES
Bedside and Verbal shift change report given to JOEL Suarez RN (oncoming nurse) by UMAIR Medrano RN (offgoing nurse). Report included the following information Nurse Handoff Report, Index, Adult Overview, Intake/Output, MAR, and Recent Results.      0900- Interpretor # [id number] used for morning assessment and vitals. Patient stating that she has been feeling cramping, rating them 6/10 in pain. Patient hasn't had breakfast yet.     0906- Patient placed on monitor    0913- OB notified of above.     0930- Irritability noted. EFM turned off    0931- Interpretor # [id number]. Patient stating she felt the contractions every 5 minutes and still rating them about 6/10.  Informed patient to go ahead and eat breakfast and keep on hydrating.     0933- Notified OB of above    0947- Perfect served OB regarding echo results    0949- Received call from OB. Stated to let patient eat and then try NST again and reassess contractions.

## 2025-02-06 NOTE — CONSULTS
[facility] CARDIOLOGY  Cardiology Care Note                  [x]Initial visit     []Established visit     Patient Name: Bri Trent - :1995 - MRN:[id number]  Primary Cardiologist: None  Consulting Cardiologist: Sukhi Estrella MD     Reason for initial visit:chest pain and sob    HPI:   Patient is a 29 year old female with a medical hx of GDM with poorly controlled blood sugars. She was admitted to the hospital at 35w4d gestation with hospital admission for management of GDM. She had a hx of 3 prior c/s deliveries. She reported during the pregnancy CP and sob. She did not seek medical care in ED for this as advised d/t insurance concerns.       SUBJECTIVE:  Pt ROS obtained by use of . She reports after becoming pregnant she developed sob and chest pressure/tightness. This has been coming and going since then. She has not noticed a trigger for the discomfort. She has sob with exertion. She reports the discomfort can be up to 8/10 when happens. She was not experiencing the pain at time of the visit. She denies improvement or worsening in symptoms with position changes. She did not have this with her prior pregnancies. She has no family hx of heart issues.        Assessment and Plan   Patient seen on the day of progress note and examined  and agree with Advance Practice Provider (RACIEL, NP,PA)  assessment and plans.    29 years old female with history of gestational diabetes no family history significant for early coronary events or sudden cardiac death who is being referred to the cardiology team for evaluation of chest discomfort.    The patient is 35 weeks pregnant     1.  Chest discomfort: This apparently is been going on for the last 2 months.    It does last approximately 15 minutes.  It does not happen with and without activities.  It is not reproducible.    Typical atypical features at this point.    EKG with normal 
1300: Bedside and Verbal shift change report given to FREDO (oncoming nurse) by GUZMAN (offgoing nurse). Report included the following information Nurse Handoff Report.      1510: I have reviewed discharge instruction and reviewed medication times. Patient given copy of discharge instructions. Patient verbalizes understanding and denies any further questions at this time.      Session ID: [id number]  Language: Lao   ID: #[id number]   Name: Azam      
[facility]  DIABETES MANAGEMENT CONSULT    Consulted by Provider for advanced nursing evaluation and care for inpatient blood glucose management.    Evaluation and Action Plan   Bri Trent is a 29 y.o. 35w4d pregnant Uzbek speaking female with T2D in pregnancy who presented from the [facility] with abnormal BG and transferred to [facility] for BG mgmt.  PTA has been unable to obtain her insulin from local pharmacy due to insurance barriers. She has been trying to control with diet and insurance was not willing to cover her insulin. +FM, no VB, no VD, no LOF, rare contraction.  PTA was prescribed NPH 17 QAM/ NPH 13 units QPM, with Lispro 13/0/13 at meals.  Discussed plan of care with patient and patient's  RN re: insulin therapy regimen while hospitalized.  Given fasting BG over goal and 126 will adjust PM NPH as per below, and add lispro with meals BID as she was previously prescribed.    Via  patient endorsed she has had diabetes outside of her pregnancies.  Pre-pregnancy she takes Metformin daily. With prior 3 pregnancies she required insulin therapy then would go back on Metformin after she delivered.  She has not been taking her insulin due to lack of obtaining it due to insurance coverage barriers.  She could not recall the last time she took insulin but it was before 25.  Patient BG log for past several days listed below: Patient reported her diet is low in starch and sweets and she consumes small portions of pastas.       2/5: BG trends much improved and post prandials in target today.  Adjusted NPH insulin so that 70% of basal in daytime and 30% basal in evening time.  Adjsuted lispro insulin as well.      Blood glucose pattern    Significant diabetes-related events over the past 24-72 hours  Fasting B  2h post prandial: 108/92  Basal: NPH AM and QHS  Bolus: lispro TID  Correction: NA  Total daily insulin dose in the last 24 hours: 21 units       GDM Glucose targets: 
Session ID: [id number]  Language: Italian   ID: #[id number]   Name: [facility]
Session ID: [id number]  Language: Turkmen   ID: #[id number]   Name: Hank
Session ID: [id number]  Language: Hungarian   ID: #[id number]   Name: Lynsey
Session ID: [id number]  Language: Kyrgyz   ID: #[id number]   Name: Jimmie
Session ID: [id number]  Language: Pashto   ID: #[id number]   Name: Adrián
Session ID: [id number]  Language: Portuguese   ID: #[id number]   Name: David
Session ID: [id number]  Language: Maori   ID: #[id number]   Name: Katerine
Session ID: [id number]  Language: Amharic   ID: #[id number]   Name: Navneet
Session ID: [id number]  Language: Sami   ID: #[id number]   Name: David
Session ID: [id number]  Language: Kinyarwanda   ID: #[id number]   Name: Meera
Session ID: [id number]  Language: Czech   ID: #[id number]   Name: Ember
Session ID: [id number]  Language: Korean   ID: #[id number]   Name: Loly
Session ID: [id number]  Language: Latvian   ID: #[id number]   Name: Loc
Session ID: [id number]  Language: Swedish   ID: #[id number]   Name: Sade
Session ID: [id number]  Language: Greenlandic   ID: #[id number]   Name: Luciana
Session ID: [id number]  Language: Sinhala   ID: #[id number]   Name: Loly
Session ID: [id number]  Language: Armenian   ID: #[id number]   Name: Ree
Session ID: [id number]  Language: Portuguese   ID: #[id number]   Name: Dipti
Session ID: [id number]  Language: Pashto   ID: #[id number]   Name: Arslan
Session ID: [id number]  Language: Yoruba   ID: #[id number]   Name: Antionette
Session ID: [id number]  Language: Korean   ID: #[id number]   Name: Ammar
QUEENIE WHITE - CNS   Diabetes Clinical Nurse Specialist   [facility] Diabetes [facility]  Access via Perfect Serve

## 2025-02-06 NOTE — DISCHARGE INSTRUCTIONS
Gestational Diabetes: Care Instructions  Overview     Gestational diabetes is high blood sugar that is first diagnosed during pregnancy. When you have this condition, the insulin in your body isn't able to keep your blood sugar in a safe range. Most of the time, gestational diabetes goes away after your baby is born.  Managing your blood sugar is the key to preventing problems during pregnancy, birth, and after your baby is born. You may be able to do this with a healthy diet, regular exercise, and checking your blood sugar at home. You may need to take diabetes medicine, such as insulin or pills, to keep your blood sugar in a safe range.  If you've had gestational diabetes, you have a greater chance of having it in a future pregnancy. You're also more likely to develop type 2 diabetes.  Follow-up care is a key part of your treatment and safety. Be sure to make and go to all appointments, and call your doctor if you are having problems. It's also a good idea to know your test results and keep a list of the medicines you take.  How can you care for yourself at home?  Make healthy food choices. Try to eat plenty of lean protein, fruits, and vegetables. Follow your meal plan to know how much carbohydrate you need for meals and snacks. A registered dietitian or diabetes educator can help you plan meals.  Get regular exercise. Doing things like walking or swimming several times a week can be good for you and your baby. If you haven't been active, talk with your doctor before you start.  Don't diet to lose weight. Ask your doctor what your range is for healthy weight gain.  Check your blood sugar as directed. Your doctor will tell you how and when to check your blood sugar.  If your doctor prescribes medicine, such as insulin or pills, use it as directed. Your doctor will tell you how and when to take your medicine.  Check your baby's movement as directed. Your doctor may ask you to report how many times in an hour

## 2025-02-06 NOTE — PROGRESS NOTES
Contacted by nursing. Some mild contractions/cramping this morning, now improved. Suspect lexi walker.     Pt also now noting some dysuria. Will send urinalysis and urine culture, will treat if any nitrites on dip, otherwise await culture results.     Echo returned wnl. Given these findings feel patient stable for discharge home today.     /76   Pulse 100   Temp 98.4 °F (36.9 °C) (Oral)   Resp 18   Ht 1.64 m (5' 4.57\")   Wt 87.5 kg (192 lb 14.4 oz)   SpO2 99%   BMI 32.53 kg/m²

## 2025-02-06 NOTE — PROGRESS NOTES
Echo showed EF 55-60%, NL RV function, NL LV size/wall thickness. NL wall motion. EKG NSR without acute ischemic changes of significance. HS troponin neg. No further cardiac evaluation at this time. Will arrange for office appt in 6-8 weeks to follow up and see if still experiencing symptoms. Can consider ischemic eval at that time.

## 2025-02-06 NOTE — PROGRESS NOTES
1930: Bedside and Verbal shift change report given to UMAIR Medrano RN (oncoming nurse) by DELMAR Akhtar RN (offgoing nurse). Report included the following information Nurse Handoff Report, Index, Adult Overview, Intake/Output, MAR, Recent Results, and Med Rec Status.

## 2025-02-06 NOTE — DISCHARGE SUMMARY
Obstetrical Discharge Summary     Name: Bri Trent MRN: [id number]  SSN: [id number]    YOB: 1995  Age: 29 y.o.  Sex: female      Admit Date: 2/3/2025    Discharge Date: 2/6/2025     Admitting Physician: Lilli Mosquera MD     Attending Physician:  Lilli Mosquera MD     Admission Diagnoses: Gestational diabetes mellitus (GDM) affecting pregnancy [O24.419]  Gestational diabetes [O24.419]    Discharge Diagnoses: GDM - poor control  This patient has no babies on file.     Additional Diagnoses:  No components found for: \"OBEXTABORH\", \"OBEXTABSCRN\", \"OBEXTRUBELLA\", \"OBEXTGRBS\"    Hospital Course: Patient admitted at ~35 wks GA for poorly controlled GDM. Diabetes treatment team consulted, insulin started, and BG control improved. Episode of chest pain in hospital, cardiology consulted, workup including EKG and echo wnl. Pt stable for discharge home today with planned CS in 1 week.    Lilli Mosquera MD  2/6/2025  1:03 PM        Patient Instructions:   Current Discharge Medication List        CONTINUE these medications which have CHANGED    Details   insulin lispro, 1 Unit Dial, (HUMALOG KWIKPEN) 100 UNIT/ML SOPN Inject 13 Units into the skin daily (with breakfast) AND 13 Units Daily with supper. Inject 13 units with breakfast and 13 units with dinner. Please substitute for insurance preferred brand..  Qty: 2 Adjustable Dose Pre-filled Pen Syringe, Refills: 0      insulin NPH (HUMULIN N KWIKPEN) 100 UNIT/ML injection pen Inject 25 Units into the skin every morning AND 20 Units nightly.  Qty: 3 Adjustable Dose Pre-filled Pen Syringe, Refills: 0           CONTINUE these medications which have NOT CHANGED    Details   !! Alcohol Swabs (ALCOHOL PREP) 70 % PADS Use to clean skin when checking blood sugar  Qty: 200 each, Refills: 5      Insulin Pen Needle 32G X 6 MM MISC Please use to administer insulin daily.  Qty: 100 each, Refills: 5      !! Alcohol Swabs (ALCOHOL PREP) PADS Please use to four times a day

## 2025-02-07 LAB
BACTERIA SPEC CULT: NORMAL
SERVICE CMNT-IMP: NORMAL

## 2025-02-10 ENCOUNTER — ROUTINE PRENATAL (OUTPATIENT)
Age: 30
End: 2025-02-10
Payer: MEDICAID

## 2025-02-10 VITALS
HEART RATE: 105 BPM | SYSTOLIC BLOOD PRESSURE: 108 MMHG | OXYGEN SATURATION: 97 % | DIASTOLIC BLOOD PRESSURE: 68 MMHG | RESPIRATION RATE: 16 BRPM

## 2025-02-10 DIAGNOSIS — O24.419 GDM, CLASS A2: Primary | ICD-10-CM

## 2025-02-10 PROCEDURE — 99213 OFFICE O/P EST LOW 20 MIN: CPT | Performed by: OBSTETRICS & GYNECOLOGY

## 2025-02-10 PROCEDURE — 76819 FETAL BIOPHYS PROFIL W/O NST: CPT | Performed by: OBSTETRICS & GYNECOLOGY

## 2025-02-10 NOTE — PROGRESS NOTES
Patient was seen 2/10/2025      Please look under media to view full consult and ultrasound report in ViewPoint.         Girish Jones MD  Maternal Fetal Medicine   
Render Risk Assessment In Note?: no
Detail Level: Simple
Comment: Patient very happy with results

## 2025-02-10 NOTE — PROCEDURES
PATIENT: JANAY VELASCO   -  : 1995   -  DOS:02/10/2025   -  INTERPRETING PROVIDER:Girish Jones,   Indication  ========    A2DM (non compliant)    Method  ======    Transabdominal ultrasound examination. View: suboptimal due to advanced gestational age    Pregnancy  =========    [place] pregnancy. Number of fetuses: 1    Dating  ======    Previous Ultrasound on: 2024  Type of prior assessment: GA  GA at prior assessment date 9 w + 0 d  GA by previous U/S 36 w + 4 d  BASIL by previous Ultrasound: 3/6/2025  Assigned: based on ultrasound (GA), selected on 2025  Assigned GA 36 w + 4 d  Assigned BASIL: 3/6/2025    General Evaluation  ==============    Cardiac activity present.  bpm. Fetal movements: visualized. Presentation: Cephalic  Placenta: Placental site: posterior, appropriate distance from the internal os  Umbilical cord: Cord vessels: 3 vessel cord    Fetal Anatomy  ===========    Face  Maxilla: SUBOPTIMAL  Stomach: normal  Kidneys: normal  Bladder: normal  Wants to know fetal sex: yes    Amniotic Fluid Assessment  =====================    Amount of AF: normal  MVP 7.4 cm    Biophysical Profile  ==============    2: Fetal breathing movements  2: Gross body movements  2: Fetal tone  2: Amniotic fluid volume  8/8 Biophysical profile score    Findings  =======    Intrauterine [place] pregnancy at 36w 4d by clinical dates.  Amniotic fluid: normal.  Placenta is posterior, appropriate distance from the internal os.  Cephalic presentation.  Biophysical profile score is 8/8.    The ultrasound findings as listed above and diagnostic limitations of ultrasound imaging, including inability to exclude all anomalies, have been reviewed with the patient. All  questions and concerns addressed.    Consultation  ==========        JANAY is 29 yrs of age,  seen for a pregnancy complicated by:    Reassuring BPP,      utilized: Missy (#[id number])    Gestational Diabetes: A2, poorly

## 2025-02-12 ENCOUNTER — HOSPITAL ENCOUNTER (OUTPATIENT)
Facility: HOSPITAL | Age: 30
Discharge: HOME | DRG: 539 | End: 2025-02-12
Attending: OBSTETRICS & GYNECOLOGY | Admitting: OBSTETRICS & GYNECOLOGY
Payer: MEDICAID

## 2025-02-12 VITALS
BODY MASS INDEX: 31.99 KG/M2 | TEMPERATURE: 98.2 F | WEIGHT: 192 LBS | RESPIRATION RATE: 16 BRPM | HEART RATE: 84 BPM | SYSTOLIC BLOOD PRESSURE: 95 MMHG | HEIGHT: 65 IN | DIASTOLIC BLOOD PRESSURE: 63 MMHG

## 2025-02-12 LAB
ABO + RH BLD: NORMAL
BLOOD GROUP ANTIBODIES SERPL: NORMAL
ERYTHROCYTE [DISTWIDTH] IN BLOOD BY AUTOMATED COUNT: 15.9 % (ref 11.5–14.5)
HCT VFR BLD AUTO: 35.1 % (ref 35–47)
HGB BLD-MCNC: 10.8 G/DL (ref 11.5–16)
MCH RBC QN AUTO: 21.2 PG (ref 26–34)
MCHC RBC AUTO-ENTMCNC: 30.8 G/DL (ref 30–36.5)
MCV RBC AUTO: 68.8 FL (ref 80–99)
NRBC # BLD: 0 K/UL (ref 0–0.01)
NRBC BLD-RTO: 0 PER 100 WBC
PLATELET # BLD AUTO: 212 K/UL (ref 150–400)
RBC # BLD AUTO: 5.1 M/UL (ref 3.8–5.2)
RPR SER QL: NONREACTIVE
SPECIMEN EXP DATE BLD: NORMAL
WBC # BLD AUTO: 10.9 K/UL (ref 3.6–11)

## 2025-02-12 PROCEDURE — 86900 BLOOD TYPING SEROLOGIC ABO: CPT

## 2025-02-12 PROCEDURE — 85027 COMPLETE CBC AUTOMATED: CPT

## 2025-02-12 PROCEDURE — 86592 SYPHILIS TEST NON-TREP QUAL: CPT

## 2025-02-12 PROCEDURE — 86850 RBC ANTIBODY SCREEN: CPT

## 2025-02-12 PROCEDURE — 36415 COLL VENOUS BLD VENIPUNCTURE: CPT

## 2025-02-12 PROCEDURE — 86901 BLOOD TYPING SEROLOGIC RH(D): CPT

## 2025-02-12 NOTE — H&P
History & Physical    Name: Bri Trent MRN: [id number]  SSN: [id number]    YOB: 1995  Age: 29 y.o.  Sex: female        Subjective:     Estimated Date of Delivery: 3/6/25  OB History          4    Para   3    Term   3            AB        Living   3         SAB        IAB        Ectopic        Molar        Multiple        Live Births   3              Ms. Trent is a 30 yo  at 36w6d admitted for scheduled repeat CS with bilateral salpingectomy.     Good FM, no LOF, VB or ctx.    GDM - poor control. On insulin.    Please see prenatal records for details.    Patient Active Problem List    Diagnosis Date Noted    Chest pain 2025    Gestational diabetes 2025    Pre-existing type 2 diabetes mellitus during pregnancy in third trimester 2025    Type 2 diabetes mellitus with hyperglycemia (HCC) 2025    Gestational diabetes mellitus (GDM) affecting pregnancy 2025    Pregnancy 2024     Primary Provider:  [place]; transfer to Dr Mosquera    Tentative RLTCS w/ bilat salpingectomy requested 25 noon (request Dr. Fuller to assist), ethics approval pending as of   EDC by 1st trim US  ;  trusted  (Father Noel); from [place]; newly in [place]  Hx CS x3; largest 7lb all prior in [place]  FH colon cancer  Hyperemesis; hospitalized 8wk; resolved  Gestational DM: fasting BG 120s, postprandials highs of 200s, has been prescribed insulin NPH , Lispro /, not yet started.  IOB labs: A pos / normal  Genetic Screening: panorama nl GIRL; horizon 4/4 neg, MSAFP neg  Anatomy: normal  Flu: next visit, TDAP: done   COVID:__  RSV:__  GBS:             Past Medical History:   Diagnosis Date    Diabetes mellitus (HCC)     Hyperemesis      Past Surgical History:   Procedure Laterality Date     SECTION      CHOLECYSTECTOMY, OPEN      2017     Social History     Socioeconomic History    Marital status:    Tobacco Use    Smoking status:

## 2025-02-12 NOTE — PROGRESS NOTES
Patient here for Pre-Admission Testing (PAT) for scheduled  section. PAT packet reviewed with the patient. Labs drawn and sent. SERGIO wipes and preventing surgical site infection education given to the patient. Education also provided to be NPO after 6am and to arrive for scheduled procedure at 10am on 25. Patient verbalizes understanding and sent home with PAT packet for further review.     Patient instructed to take no medication(s) on the morning of her scheduled procedure.

## 2025-02-13 ENCOUNTER — ANESTHESIA (OUTPATIENT)
Facility: HOSPITAL | Age: 30
End: 2025-02-13
Payer: MEDICAID

## 2025-02-13 ENCOUNTER — ANESTHESIA EVENT (OUTPATIENT)
Facility: HOSPITAL | Age: 30
End: 2025-02-13
Payer: MEDICAID

## 2025-02-13 ENCOUNTER — HOSPITAL ENCOUNTER (INPATIENT)
Facility: HOSPITAL | Age: 30
LOS: 3 days | Discharge: HOME | DRG: 539 | End: 2025-02-16
Attending: OBSTETRICS & GYNECOLOGY | Admitting: OBSTETRICS & GYNECOLOGY
Payer: MEDICAID

## 2025-02-13 DIAGNOSIS — G89.18 POST-OP PAIN: Primary | ICD-10-CM

## 2025-02-13 PROBLEM — Z90.79 STATUS POST BILATERAL SALPINGECTOMY: Status: ACTIVE | Noted: 2025-02-13

## 2025-02-13 PROBLEM — R11.2 NAUSEA AND VOMITING: Status: ACTIVE | Noted: 2025-02-13

## 2025-02-13 LAB
GLUCOSE BLD STRIP.AUTO-MCNC: 92 MG/DL (ref 65–117)
SERVICE CMNT-IMP: NORMAL

## 2025-02-13 PROCEDURE — 3609079900 HC CESAREAN SECTION: Performed by: OBSTETRICS & GYNECOLOGY

## 2025-02-13 PROCEDURE — 6360000002 HC RX W HCPCS: Performed by: NURSE ANESTHETIST, CERTIFIED REGISTERED

## 2025-02-13 PROCEDURE — 58700 REMOVAL OF FALLOPIAN TUBE: CPT | Performed by: OBSTETRICS & GYNECOLOGY

## 2025-02-13 PROCEDURE — 7100000001 HC PACU RECOVERY - ADDTL 15 MIN: Performed by: OBSTETRICS & GYNECOLOGY

## 2025-02-13 PROCEDURE — 59510 CESAREAN DELIVERY: CPT | Performed by: OBSTETRICS & GYNECOLOGY

## 2025-02-13 PROCEDURE — 59514 CESAREAN DELIVERY ONLY: CPT | Performed by: OBSTETRICS & GYNECOLOGY

## 2025-02-13 PROCEDURE — 6360000002 HC RX W HCPCS: Performed by: OBSTETRICS & GYNECOLOGY

## 2025-02-13 PROCEDURE — 6360000002 HC RX W HCPCS: Performed by: ANESTHESIOLOGY

## 2025-02-13 PROCEDURE — 2720000010 HC SURG SUPPLY STERILE: Performed by: OBSTETRICS & GYNECOLOGY

## 2025-02-13 PROCEDURE — 0UT70ZZ RESECTION OF BILATERAL FALLOPIAN TUBES, OPEN APPROACH: ICD-10-PCS | Performed by: OBSTETRICS & GYNECOLOGY

## 2025-02-13 PROCEDURE — 6370000000 HC RX 637 (ALT 250 FOR IP): Performed by: OBSTETRICS & GYNECOLOGY

## 2025-02-13 PROCEDURE — 2580000003 HC RX 258

## 2025-02-13 PROCEDURE — 7100000000 HC PACU RECOVERY - FIRST 15 MIN: Performed by: OBSTETRICS & GYNECOLOGY

## 2025-02-13 PROCEDURE — 1120000000 HC RM PRIVATE OB

## 2025-02-13 PROCEDURE — 2580000003 HC RX 258: Performed by: OBSTETRICS & GYNECOLOGY

## 2025-02-13 PROCEDURE — 3700000000 HC ANESTHESIA ATTENDED CARE: Performed by: OBSTETRICS & GYNECOLOGY

## 2025-02-13 PROCEDURE — 2580000003 HC RX 258: Performed by: NURSE ANESTHETIST, CERTIFIED REGISTERED

## 2025-02-13 PROCEDURE — 3700000001 HC ADD 15 MINUTES (ANESTHESIA): Performed by: OBSTETRICS & GYNECOLOGY

## 2025-02-13 PROCEDURE — 88302 TISSUE EXAM BY PATHOLOGIST: CPT

## 2025-02-13 PROCEDURE — 6370000000 HC RX 637 (ALT 250 FOR IP)

## 2025-02-13 PROCEDURE — 82962 GLUCOSE BLOOD TEST: CPT

## 2025-02-13 PROCEDURE — 2709999900 HC NON-CHARGEABLE SUPPLY: Performed by: OBSTETRICS & GYNECOLOGY

## 2025-02-13 PROCEDURE — APPNB30 APP NON BILLABLE TIME 0-30 MINS

## 2025-02-13 PROCEDURE — 2500000003 HC RX 250 WO HCPCS: Performed by: OBSTETRICS & GYNECOLOGY

## 2025-02-13 RX ORDER — NALOXONE HYDROCHLORIDE 0.4 MG/ML
INJECTION, SOLUTION INTRAMUSCULAR; INTRAVENOUS; SUBCUTANEOUS PRN
Status: ACTIVE | OUTPATIENT
Start: 2025-02-13 | End: 2025-02-14

## 2025-02-13 RX ORDER — SODIUM CHLORIDE 0.9 % (FLUSH) 0.9 %
10 SYRINGE (ML) INJECTION EVERY 12 HOURS SCHEDULED
Status: DISCONTINUED | OUTPATIENT
Start: 2025-02-13 | End: 2025-02-14 | Stop reason: SDUPTHER

## 2025-02-13 RX ORDER — MODIFIED LANOLIN
OINTMENT (GRAM) TOPICAL
Status: DISCONTINUED | OUTPATIENT
Start: 2025-02-13 | End: 2025-02-16 | Stop reason: HOSPADM

## 2025-02-13 RX ORDER — ONDANSETRON 2 MG/ML
4 INJECTION INTRAMUSCULAR; INTRAVENOUS EVERY 6 HOURS PRN
Status: DISCONTINUED | OUTPATIENT
Start: 2025-02-13 | End: 2025-02-16 | Stop reason: HOSPADM

## 2025-02-13 RX ORDER — POLYETHYLENE GLYCOL 3350 17 G/17G
17 POWDER, FOR SOLUTION ORAL DAILY PRN
Status: DISCONTINUED | OUTPATIENT
Start: 2025-02-13 | End: 2025-02-16 | Stop reason: HOSPADM

## 2025-02-13 RX ORDER — SODIUM CHLORIDE 9 MG/ML
INJECTION, SOLUTION INTRAVENOUS PRN
Status: DISCONTINUED | OUTPATIENT
Start: 2025-02-13 | End: 2025-02-16 | Stop reason: HOSPADM

## 2025-02-13 RX ORDER — IBUPROFEN 400 MG/1
800 TABLET, FILM COATED ORAL EVERY 8 HOURS
Status: DISCONTINUED | OUTPATIENT
Start: 2025-02-14 | End: 2025-02-16 | Stop reason: HOSPADM

## 2025-02-13 RX ORDER — ACETAMINOPHEN 325 MG/1
975 TABLET ORAL ONCE
Status: COMPLETED | OUTPATIENT
Start: 2025-02-13 | End: 2025-02-13

## 2025-02-13 RX ORDER — PROMETHAZINE HYDROCHLORIDE 25 MG/1
25 TABLET ORAL EVERY 6 HOURS PRN
Status: DISCONTINUED | OUTPATIENT
Start: 2025-02-13 | End: 2025-02-16 | Stop reason: HOSPADM

## 2025-02-13 RX ORDER — KETOROLAC TROMETHAMINE 30 MG/ML
INJECTION, SOLUTION INTRAMUSCULAR; INTRAVENOUS
Status: DISCONTINUED | OUTPATIENT
Start: 2025-02-13 | End: 2025-02-13 | Stop reason: SDUPTHER

## 2025-02-13 RX ORDER — MORPHINE SULFATE 1 MG/ML
INJECTION, SOLUTION EPIDURAL; INTRATHECAL; INTRAVENOUS
Status: COMPLETED | OUTPATIENT
Start: 2025-02-13 | End: 2025-02-13

## 2025-02-13 RX ORDER — SODIUM CHLORIDE 0.9 % (FLUSH) 0.9 %
10 SYRINGE (ML) INJECTION PRN
Status: DISCONTINUED | OUTPATIENT
Start: 2025-02-13 | End: 2025-02-14 | Stop reason: SDUPTHER

## 2025-02-13 RX ORDER — OXYCODONE HYDROCHLORIDE 5 MG/1
10 TABLET ORAL EVERY 4 HOURS PRN
Status: DISCONTINUED | OUTPATIENT
Start: 2025-02-14 | End: 2025-02-16 | Stop reason: HOSPADM

## 2025-02-13 RX ORDER — SODIUM CHLORIDE, SODIUM LACTATE, POTASSIUM CHLORIDE, CALCIUM CHLORIDE 600; 310; 30; 20 MG/100ML; MG/100ML; MG/100ML; MG/100ML
INJECTION, SOLUTION INTRAVENOUS CONTINUOUS
Status: DISPENSED | OUTPATIENT
Start: 2025-02-13 | End: 2025-02-14

## 2025-02-13 RX ORDER — OXYCODONE HYDROCHLORIDE 5 MG/1
5 TABLET ORAL EVERY 4 HOURS PRN
Status: DISCONTINUED | OUTPATIENT
Start: 2025-02-14 | End: 2025-02-16 | Stop reason: HOSPADM

## 2025-02-13 RX ORDER — SODIUM CHLORIDE 0.9 % (FLUSH) 0.9 %
5-40 SYRINGE (ML) INJECTION EVERY 12 HOURS SCHEDULED
Status: DISCONTINUED | OUTPATIENT
Start: 2025-02-13 | End: 2025-02-16 | Stop reason: HOSPADM

## 2025-02-13 RX ORDER — SODIUM CHLORIDE, SODIUM LACTATE, POTASSIUM CHLORIDE, CALCIUM CHLORIDE 600; 310; 30; 20 MG/100ML; MG/100ML; MG/100ML; MG/100ML
INJECTION, SOLUTION INTRAVENOUS CONTINUOUS
Status: DISCONTINUED | OUTPATIENT
Start: 2025-02-13 | End: 2025-02-16 | Stop reason: HOSPADM

## 2025-02-13 RX ORDER — ONDANSETRON 2 MG/ML
INJECTION INTRAMUSCULAR; INTRAVENOUS
Status: DISCONTINUED | OUTPATIENT
Start: 2025-02-13 | End: 2025-02-13 | Stop reason: SDUPTHER

## 2025-02-13 RX ORDER — MISOPROSTOL 200 UG/1
800 TABLET ORAL PRN
Status: DISCONTINUED | OUTPATIENT
Start: 2025-02-13 | End: 2025-02-16 | Stop reason: HOSPADM

## 2025-02-13 RX ORDER — NALBUPHINE HYDROCHLORIDE 10 MG/ML
5 INJECTION INTRAMUSCULAR; INTRAVENOUS; SUBCUTANEOUS EVERY 4 HOURS PRN
Status: ACTIVE | OUTPATIENT
Start: 2025-02-13 | End: 2025-02-14

## 2025-02-13 RX ORDER — ACETAMINOPHEN 500 MG
1000 TABLET ORAL EVERY 8 HOURS SCHEDULED
Status: DISCONTINUED | OUTPATIENT
Start: 2025-02-13 | End: 2025-02-16 | Stop reason: HOSPADM

## 2025-02-13 RX ORDER — SODIUM CHLORIDE 0.9 % (FLUSH) 0.9 %
5-40 SYRINGE (ML) INJECTION PRN
Status: DISCONTINUED | OUTPATIENT
Start: 2025-02-13 | End: 2025-02-16 | Stop reason: HOSPADM

## 2025-02-13 RX ORDER — SODIUM CHLORIDE, SODIUM LACTATE, POTASSIUM CHLORIDE, AND CALCIUM CHLORIDE .6; .31; .03; .02 G/100ML; G/100ML; G/100ML; G/100ML
500 INJECTION, SOLUTION INTRAVENOUS ONCE
Status: COMPLETED | OUTPATIENT
Start: 2025-02-13 | End: 2025-02-13

## 2025-02-13 RX ORDER — SODIUM CHLORIDE 9 MG/ML
INJECTION, SOLUTION INTRAVENOUS PRN
Status: DISCONTINUED | OUTPATIENT
Start: 2025-02-13 | End: 2025-02-14 | Stop reason: SDUPTHER

## 2025-02-13 RX ORDER — SIMETHICONE 80 MG
80 TABLET,CHEWABLE ORAL EVERY 6 HOURS PRN
Status: DISCONTINUED | OUTPATIENT
Start: 2025-02-13 | End: 2025-02-16 | Stop reason: HOSPADM

## 2025-02-13 RX ORDER — DOCUSATE SODIUM 100 MG/1
100 CAPSULE, LIQUID FILLED ORAL 2 TIMES DAILY PRN
Status: DISCONTINUED | OUTPATIENT
Start: 2025-02-13 | End: 2025-02-16 | Stop reason: HOSPADM

## 2025-02-13 RX ORDER — ONDANSETRON 4 MG/1
4 TABLET, ORALLY DISINTEGRATING ORAL EVERY 8 HOURS PRN
Status: DISCONTINUED | OUTPATIENT
Start: 2025-02-13 | End: 2025-02-16 | Stop reason: HOSPADM

## 2025-02-13 RX ORDER — SODIUM CHLORIDE, SODIUM LACTATE, POTASSIUM CHLORIDE, CALCIUM CHLORIDE 600; 310; 30; 20 MG/100ML; MG/100ML; MG/100ML; MG/100ML
INJECTION, SOLUTION INTRAVENOUS
Status: DISCONTINUED | OUTPATIENT
Start: 2025-02-13 | End: 2025-02-13 | Stop reason: SDUPTHER

## 2025-02-13 RX ORDER — BUPIVACAINE HYDROCHLORIDE 7.5 MG/ML
INJECTION, SOLUTION INTRASPINAL
Status: COMPLETED | OUTPATIENT
Start: 2025-02-13 | End: 2025-02-13

## 2025-02-13 RX ORDER — ONDANSETRON 2 MG/ML
4 INJECTION INTRAMUSCULAR; INTRAVENOUS EVERY 6 HOURS PRN
Status: DISCONTINUED | OUTPATIENT
Start: 2025-02-13 | End: 2025-02-14 | Stop reason: SDUPTHER

## 2025-02-13 RX ORDER — FENTANYL CITRATE 50 UG/ML
INJECTION, SOLUTION INTRAMUSCULAR; INTRAVENOUS
Status: COMPLETED | OUTPATIENT
Start: 2025-02-13 | End: 2025-02-13

## 2025-02-13 RX ORDER — KETOROLAC TROMETHAMINE 30 MG/ML
30 INJECTION, SOLUTION INTRAMUSCULAR; INTRAVENOUS EVERY 6 HOURS
Status: COMPLETED | OUTPATIENT
Start: 2025-02-13 | End: 2025-02-14

## 2025-02-13 RX ORDER — SODIUM CHLORIDE, SODIUM LACTATE, POTASSIUM CHLORIDE, AND CALCIUM CHLORIDE .6; .31; .03; .02 G/100ML; G/100ML; G/100ML; G/100ML
1000 INJECTION, SOLUTION INTRAVENOUS ONCE
Status: COMPLETED | OUTPATIENT
Start: 2025-02-13 | End: 2025-02-13

## 2025-02-13 RX ADMIN — KETOROLAC TROMETHAMINE 30 MG: 30 INJECTION, SOLUTION INTRAMUSCULAR; INTRAVENOUS at 20:21

## 2025-02-13 RX ADMIN — FENTANYL CITRATE 15 MCG: 50 INJECTION INTRAMUSCULAR; INTRAVENOUS at 12:17

## 2025-02-13 RX ADMIN — ONDANSETRON 4 MG: 2 INJECTION INTRAMUSCULAR; INTRAVENOUS at 12:14

## 2025-02-13 RX ADMIN — KETOROLAC TROMETHAMINE 30 MG: 30 INJECTION, SOLUTION INTRAMUSCULAR; INTRAVENOUS at 13:44

## 2025-02-13 RX ADMIN — FAMOTIDINE 20 MG: 10 INJECTION, SOLUTION INTRAVENOUS at 11:49

## 2025-02-13 RX ADMIN — SODIUM CHLORIDE, SODIUM LACTATE, POTASSIUM CHLORIDE, AND CALCIUM CHLORIDE 1000 ML: .6; .31; .03; .02 INJECTION, SOLUTION INTRAVENOUS at 11:00

## 2025-02-13 RX ADMIN — WATER 2000 MG: 1 INJECTION INTRAMUSCULAR; INTRAVENOUS; SUBCUTANEOUS at 12:10

## 2025-02-13 RX ADMIN — ACETAMINOPHEN 975 MG: 325 TABLET ORAL at 11:44

## 2025-02-13 RX ADMIN — MORPHINE SULFATE 0.15 MG: 1 INJECTION EPIDURAL; INTRATHECAL; INTRAVENOUS at 12:17

## 2025-02-13 RX ADMIN — PROMETHAZINE HYDROCHLORIDE 25 MG: 25 TABLET ORAL at 22:16

## 2025-02-13 RX ADMIN — SODIUM CHLORIDE, POTASSIUM CHLORIDE, SODIUM LACTATE AND CALCIUM CHLORIDE: 600; 310; 30; 20 INJECTION, SOLUTION INTRAVENOUS at 12:12

## 2025-02-13 RX ADMIN — DEXTROSE MONOHYDRATE 50 MCG/MIN: 50 INJECTION, SOLUTION INTRAVENOUS at 12:14

## 2025-02-13 RX ADMIN — Medication 909 ML/HR: at 12:46

## 2025-02-13 RX ADMIN — BUPIVACAINE HYDROCHLORIDE WITH DEXTROSE 11 MG: 7.5 INJECTION SUBARACHNOID at 12:17

## 2025-02-13 RX ADMIN — SODIUM CHLORIDE, POTASSIUM CHLORIDE, SODIUM LACTATE AND CALCIUM CHLORIDE 500 ML: 600; 310; 30; 20 INJECTION, SOLUTION INTRAVENOUS at 20:19

## 2025-02-13 RX ADMIN — ONDANSETRON 4 MG: 2 INJECTION INTRAMUSCULAR; INTRAVENOUS at 18:50

## 2025-02-13 ASSESSMENT — PAIN SCALES - GENERAL: PAINLEVEL_OUTOF10: 6

## 2025-02-13 ASSESSMENT — PAIN DESCRIPTION - DESCRIPTORS: DESCRIPTORS: ACHING

## 2025-02-13 ASSESSMENT — PAIN - FUNCTIONAL ASSESSMENT: PAIN_FUNCTIONAL_ASSESSMENT: ACTIVITIES ARE NOT PREVENTED

## 2025-02-13 ASSESSMENT — PAIN DESCRIPTION - ORIENTATION: ORIENTATION: LOWER

## 2025-02-13 ASSESSMENT — PAIN DESCRIPTION - LOCATION: LOCATION: INCISION

## 2025-02-13 NOTE — OP NOTE
during the procedure.  The physician's presence was necessary due to concern for adhesive disease due to 3 prior CS. The assistant surgeon performed significant portions of the surgery, including incising tissue, clamping, control of bleeding with suturing and cauterization, and decision making at challenging portions of the procedure.  This assistance was necessary to accomplish the optimal outcome for the patient, above and beyond what a non-MD assistant could have provided.     Findings: Normal uterus, bilateral tubes, and ovaries. Minimal adhesive disease. Live born infant in cephalic ROT presentation, no meconium, no nuchal cord. APGARs = 9,9, Weight = pending  NICU present for delivery? No  Specimen: Placenta was not sent to pathology, bilateral fallopian tubes were sent to pathology  Medications: 2g ancef pre-operatively, 20U dilute pitocin after placental delivery  Disposition: Mother and infant to recovery room in stable condition    Procedure:  The patient was taken to the operating room, she was given regional anesthesia and was placed on the operating table in the supine position.  A chamberlain catheter was placed to drain the bladder intra-operatively. She was prepped and draped in a sterile fashion and a time out was performed. Prior to incision, the level of anesthesia was rechecked and was found to be adequate.    A Pfannensteil incision was made 2cm above the pubic symphysis at site of prior scar. The incision was carried down sharply to the level of the rectus fascia. The fascia was incised sharply with the knife, and the incision was extended bilaterally and superiorly with mcqueen scissors. The superior border of the fascia was then grasped with two Kocher clamps, was elevated and bluntly dissected off the rectus muscles, followed by incision of the median raphe with mcqueen scissors. Attention was then turned to the inferior border of the fascia and it was dissected off in a similar manner down to the

## 2025-02-13 NOTE — ANESTHESIA PRE PROCEDURE
Screening (If Applicable):   Lab Results   Component Value Date/Time    COVID19 Not detected 07/23/2024 10:20 AM           Anesthesia Evaluation  Patient summary reviewed and Nursing notes reviewed  Airway: Mallampati: II  TM distance: >3 FB   Neck ROM: full  Mouth opening: > = 3 FB   Dental: normal exam         Pulmonary:Negative Pulmonary ROS and normal exam                               Cardiovascular:Negative CV ROS  Exercise tolerance: good (>4 METS)                    Neuro/Psych:   Negative Neuro/Psych ROS              GI/Hepatic/Renal: Neg GI/Hepatic/Renal ROS            Endo/Other: Negative Endo/Other ROS                    Abdominal: normal exam            Vascular: negative vascular ROS.         Other Findings:             Anesthesia Plan      spinal     ASA 2       Induction: intravenous.    MIPS: Postoperative opioids intended and Prophylactic antiemetics administered.  Anesthetic plan and risks discussed with patient.      Plan discussed with CRNA.    Attending anesthesiologist reviewed and agrees with Preprocedure content      Post-op pain plan if not by surgeon: intrathecal narcotics            Eran Esquivel MD   2/13/2025

## 2025-02-13 NOTE — CONSULTS
Session ID: [id number]  Language: Estonian   ID: #[id number]   Name: Aldo Nelson- pt arrived for scheduled C Section    1130-Bedside and Verbal shift change report given to ANGLE Reeves RN (oncoming nurse) by SHARRON Aponte RN (offgoing nurse). Report included the following information Nurse Handoff Report, Intake/Output, MAR, and Recent Results.

## 2025-02-13 NOTE — LACTATION NOTE
This note was copied from a baby's chart.  Initial Lactation Consultation - baby born by  today to a  mom at 37 weeks gestation. Mom speaks Spanish and the  services were used to communicate with the parents. Mom states she breast fed her first 2 children but not the third one. She said her plan is to bottle feed formula here at the hospital and then breast and bottle feed when she goes home. I offered to set mom up with the pump. She declined at this time. She said she is tired.

## 2025-02-13 NOTE — ANESTHESIA PROCEDURE NOTES
Spinal Block    Patient location during procedure: OR  End time: 2/13/2025 12:18 PM  Reason for block: primary anesthetic  Staffing  Performed: anesthesiologist   Anesthesiologist: Eran Esquivel MD  Performed by: Eran Esquivel MD  Authorized by: Eran Esquivel MD    Spinal Block  Patient position: sitting  Prep: DuraPrep  Patient monitoring: cardiac monitor, continuous pulse ox, frequent blood pressure checks and oxygen  Approach: midline  Location: L3/L4  Provider prep: mask and sterile gloves  Needle  Needle type: pencil-tip   Needle gauge: 24 G  Needle length: 4 in  Assessment  Sensory level: T4  Events: None  Swirl obtained: Yes  CSF: clear  Attempts: 1  Hemodynamics: stable  Preanesthetic Checklist  Completed: patient identified, IV checked, site marked, risks and benefits discussed, surgical/procedural consents, equipment checked, pre-op evaluation, timeout performed, anesthesia consent given, oxygen available, monitors applied/VS acknowledged and fire risk safety assessment completed and verbalized

## 2025-02-13 NOTE — L&D DELIVERY NOTE
Sourav Trent [[id number]]      Labor Events     Labor: No   Steroids: None  Cervical Ripening Date/Time:      Antibiotics Received during Labor: No  Rupture Identifier: Sac 1  Rupture Date/Time:  25 12:44:00   Rupture Type: AROM  Fluid Color: Clear  Fluid Volume: Large  Induction: None  Augmentation: None  Labor Complications: None       Anesthesia    Method: Spinal       Delivery Details      Delivery Date: 25 Delivery Time: 12:44:00   Delivery Type: , Low Transverse  Trial of Labor?: No   Categorization: Repeat   Priority: scheduled  Indications for : Prior Uterine Surgery       Skin Incision Type: Pfannenstiel  Uterine Incision: Low Transverse        Presentation    Presentation: Vertex       Shoulder Dystocia    Shoulder Dystocia Present?: No       Assisted Delivery Details    Forceps Attempted?: No  Vacuum Extractor Attempted?: No                           Cord    Vessels: 3 Vessels  Complications: None  Delayed Cord Clamping?: Yes  Cord Clamped Date/Time: 2025 12:44:00  Cord Blood Disposition: Discard  Gases Sent?: No              Placenta    Date/Time: 2025 12:46:00  Removal: Expressed  Appearance: Intact  Disposition: Discarded       Lacerations    Episiotomy: None  Perineal Lacerations: None  Other Lacerations: no non-perineal laceration       Blood Loss  Mother: Bri Trent #[id number]     Start of Mother's Information      Delivery Blood Loss   Intrapartum & Postpartum: 25 1212 - 25 1348    Delivery Admission: 25 1038 - 25 1348         Intrapartum & Postpartum Delivery Admission    None                  End of Mother's Information  Mother: Bri Trent #[id number]                Delivery Providers    Delivering clinician: Lilli Mosquera MD     Provider Role    Lilli Mosquera MD Obstetrician    Selina Reeves, RN Primary Nurse    Case, Clarita OH RN Primary  Nurse     NICU Nurse

## 2025-02-13 NOTE — ANESTHESIA POSTPROCEDURE EVALUATION
Post-Anesthesia Evaluation and Assessment    Patient: Bri Trent MRN: [id number]  SSN: [id number]    YOB: 1995  Age: 29 y.o.  Sex: female      I have evaluated the patient and they are stable and ready for discharge from the PACU.     Cardiovascular Function/Vital Signs  Visit Vitals  BP (!) 94/52   Pulse 92   Temp 97.7 °F (36.5 °C) (Skin)   Resp 18   SpO2 100%   Breastfeeding Unknown       Patient is status post Epidural anesthesia for Procedure(s):   SECTION (E R A S).    Nausea/Vomiting: None    Postoperative hydration reviewed and adequate.    Pain:      Managed    Neurological Status:       At baseline    Mental Status, Level of Consciousness: Alert and  oriented to person, place, and time    Pulmonary Status:       Adequate oxygenation and airway patent    Complications related to anesthesia: None    Post-anesthesia assessment completed. No concerns    Signed By: Eran Esquivel MD     2025

## 2025-02-14 LAB
ERYTHROCYTE [DISTWIDTH] IN BLOOD BY AUTOMATED COUNT: 15.8 % (ref 11.5–14.5)
HCT VFR BLD AUTO: 27.1 % (ref 35–47)
HGB BLD-MCNC: 8.4 G/DL (ref 11.5–16)
MCH RBC QN AUTO: 21.5 PG (ref 26–34)
MCHC RBC AUTO-ENTMCNC: 31 G/DL (ref 30–36.5)
MCV RBC AUTO: 69.3 FL (ref 80–99)
NRBC # BLD: 0 K/UL (ref 0–0.01)
NRBC BLD-RTO: 0 PER 100 WBC
PLATELET # BLD AUTO: 188 K/UL (ref 150–400)
RBC # BLD AUTO: 3.91 M/UL (ref 3.8–5.2)
WBC # BLD AUTO: 10.8 K/UL (ref 3.6–11)

## 2025-02-14 PROCEDURE — 6360000002 HC RX W HCPCS: Performed by: OBSTETRICS & GYNECOLOGY

## 2025-02-14 PROCEDURE — 36415 COLL VENOUS BLD VENIPUNCTURE: CPT

## 2025-02-14 PROCEDURE — 6370000000 HC RX 637 (ALT 250 FOR IP): Performed by: OBSTETRICS & GYNECOLOGY

## 2025-02-14 PROCEDURE — 85027 COMPLETE CBC AUTOMATED: CPT

## 2025-02-14 PROCEDURE — 1120000000 HC RM PRIVATE OB

## 2025-02-14 PROCEDURE — 58700 REMOVAL OF FALLOPIAN TUBE: CPT | Performed by: OBSTETRICS & GYNECOLOGY

## 2025-02-14 RX ADMIN — OXYCODONE 5 MG: 5 TABLET ORAL at 13:29

## 2025-02-14 RX ADMIN — IBUPROFEN 800 MG: 400 TABLET, FILM COATED ORAL at 14:36

## 2025-02-14 RX ADMIN — KETOROLAC TROMETHAMINE 30 MG: 30 INJECTION, SOLUTION INTRAMUSCULAR; INTRAVENOUS at 02:38

## 2025-02-14 RX ADMIN — DOCUSATE SODIUM 100 MG: 100 CAPSULE, LIQUID FILLED ORAL at 02:40

## 2025-02-14 RX ADMIN — DOCUSATE SODIUM 100 MG: 100 CAPSULE, LIQUID FILLED ORAL at 14:36

## 2025-02-14 RX ADMIN — KETOROLAC TROMETHAMINE 30 MG: 30 INJECTION, SOLUTION INTRAMUSCULAR; INTRAVENOUS at 09:03

## 2025-02-14 RX ADMIN — ACETAMINOPHEN 1000 MG: 500 TABLET ORAL at 17:18

## 2025-02-14 RX ADMIN — OXYCODONE 10 MG: 5 TABLET ORAL at 17:04

## 2025-02-14 RX ADMIN — ACETAMINOPHEN 1000 MG: 500 TABLET ORAL at 00:38

## 2025-02-14 RX ADMIN — ACETAMINOPHEN 1000 MG: 500 TABLET ORAL at 09:03

## 2025-02-14 RX ADMIN — OXYCODONE 10 MG: 5 TABLET ORAL at 21:59

## 2025-02-14 RX ADMIN — IBUPROFEN 800 MG: 400 TABLET, FILM COATED ORAL at 21:59

## 2025-02-14 ASSESSMENT — PAIN - FUNCTIONAL ASSESSMENT
PAIN_FUNCTIONAL_ASSESSMENT: ACTIVITIES ARE NOT PREVENTED

## 2025-02-14 ASSESSMENT — PAIN DESCRIPTION - ORIENTATION
ORIENTATION: LOWER

## 2025-02-14 ASSESSMENT — PAIN DESCRIPTION - DESCRIPTORS
DESCRIPTORS: SORE
DESCRIPTORS: SORE
DESCRIPTORS: ACHING
DESCRIPTORS: SORE
DESCRIPTORS: SORE

## 2025-02-14 ASSESSMENT — PAIN SCALES - GENERAL
PAINLEVEL_OUTOF10: 4
PAINLEVEL_OUTOF10: 6
PAINLEVEL_OUTOF10: 6
PAINLEVEL_OUTOF10: 8
PAINLEVEL_OUTOF10: 8

## 2025-02-14 ASSESSMENT — PAIN DESCRIPTION - LOCATION
LOCATION: INCISION
LOCATION: ABDOMEN;INCISION
LOCATION: INCISION

## 2025-02-14 NOTE — CARE COORDINATION
Care Management Initial Assessment       RUR: 5%--low  Readmission? Yes - [facility] 2/3-  1st IM letter given? No  1st  letter given: No    Patient admitted for delivery of infant via . Recent admission at [facility] from 2/3- for poorly controlled gestational diabetes. CM provided prescription assistance during that admission. No CM needs noted during this admission.      [id number]   Service Assessment   Patient Orientation Alert and Oriented   Cognition Alert   History Provided By Medical Record   Primary Caregiver Self   Support Systems Spouse/Significant Other   PCP Verified by CM No   Prior Functional Level Independent in ADLs/IADLs   Current Functional Level Independent in ADLs/IADLs   Can patient return to prior living arrangement Yes   Ability to make needs known: Good   Family able to assist with home care needs: Yes   Financial Resources Medicaid   Social/Functional History   Lives With Spouse   Type of Home Apartment   Prior Level of Assist for ADLs Independent   Prior Level of Assist for Homemaking Independent   Homemaking Responsibilities Yes   Ambulation Assistance Independent   Prior Level of Assist for Transfers Independent   Discharge Planning   Type of Residence Apartment   Living Arrangements Spouse/Significant Other   Type of Home Care Services None   Patient expects to be discharged to: Apartment   Services At/After Discharge   Transition of Care Consult (CM Consult) N/A   Services At/After Discharge None   Mode of Transport at Discharge Other (see comment)  (in car w/spouse)   Confirm Follow Up Transport Family   Condition of Participation: Discharge Planning   The Plan for Transition of Care is related to the following treatment goals: home     Julianne Burden LMSW  Care Management [facility]  [phone]

## 2025-02-14 NOTE — PROGRESS NOTES
Postpartum Note  S/P C section, PP Day 0  Pt has had 4 episodes of vomiting this afternoon/evening.   She attempted to eat dinner and this caused nausea and vomiting.   Has not attempted to walk, chamberlain in.             Breasts soft, NT        Incision dressing appropriate, no extensive bleeding or fluid        Abdomen appears wnl for PPD0        Bowel sounds hypoactive but appropriate for postpartum and little/no food         FF @ U-1 ML, Lochia Small Rubra        Ext NT, No REP, Neg Manuel's    O: VSS, Afebrile       Patient Vitals for the past 24 hrs:   Temp Pulse Resp BP SpO2   02/13/25 2021 97.7 °F (36.5 °C) (!) 104 18 111/64 98 %   02/13/25 1602 97.7 °F (36.5 °C) 88 18 100/72 99 %   02/13/25 1542 98.1 °F (36.7 °C) 80 16 (!) 103/58 --   02/13/25 1527 -- 95 -- 110/65 --   02/13/25 1512 -- 83 -- 107/61 --   02/13/25 1504 -- 83 -- 107/63 --   02/13/25 1442 -- 75 -- 100/60 100 %   02/13/25 1437 -- 96 -- 105/68 100 %   02/13/25 1411 -- 92 -- (!) 94/52 100 %   02/13/25 1406 -- 88 -- (!) 101/56 100 %   02/13/25 1402 97.7 °F (36.5 °C) 89 18 119/76 100 %   02/13/25 1359 97.7 °F (36.5 °C) 88 16 109/68 100 %   02/13/25 1052 98 °F (36.7 °C) -- 16 129/73 100 %            A/P:   Discussed possible causes related to being post partum vs post anesthesia vs stomach vs food   Continue zofran prn.  Add phenergan prn.  IV fluid bolus now.  Pt going to continue liquids at this time. May advance diet pending on how she is feeling.    Christoph Ye, APRN - CNM

## 2025-02-14 NOTE — CARE COORDINATION
Transition of Care Plan:    RUR: 5%--low  Prior Level of Functioning: independent  Disposition: home  Follow up appointments: OB/GYN  DME needed: N/A  Transportation at discharge: in car w/spouse  IM/IMM Medicare/ letter given: N/A  Is patient a [place] and connected with [facility]? no   If yes, was [place] transfer form completed and [facility] notified?   Caregiver Contact: Maricarmen Trent, spouse, [phone]  Discharge Caregiver contacted prior to discharge? N/A  Care Conference needed? no  Barriers to discharge:  none noted       02/14/25 1720   Readmission Assessment   Number of Days since last admission? 1-7 days  ([facility] 2/3-2/6)   Previous Disposition Home with Family   Who is being Interviewed Patient  (medical record)   What was the patient's/caregiver's perception as to why they think they needed to return back to the hospital? Other (Comment)  (pregnancy concerns)   Did you visit your Primary Care Physician after you left the hospital, before you returned this time? No   Why weren't you able to visit your PCP? Did not have an appointment   Did you see a specialist, such as Cardiac, Pulmonary, Orthopedic Physician, etc. after you left the hospital? Yes   Who advised the patient to return to the hospital? Physician   Does the patient report anything that got in the way of taking their medications? Yes   What reasons did they give? Inadequate medication insurance   In our efforts to provide the best possible care to you and others like you, can you think of anything that we could have done to help you after you left the hospital the first time, so that you might not have needed to return so soon? Other (Comment)  (pregnancy related concerns)     Julianne Burden LMSW  Care Management [facility]  [phone]

## 2025-02-14 NOTE — PROGRESS NOTES
Postpartum Progress Note    Subjective: Pt doing well POD1 s/p RLTCS with bilateral salpingectomy. No acute events overnight. Pain controlled. Lochia less than menses. Tolerating diet w/o N/V. Marina catheter in place. Not yet passing flatus. Scant edema. Denies f/c, CP, SOB, or other concerns. Breastfeeding.     Objective:  Patient Vitals for the past 24 hrs:   BP Temp Temp src Pulse Resp SpO2   02/14/25 0903 113/80 97.5 °F (36.4 °C) Oral 90 16 98 %   02/14/25 0420 98/63 98.4 °F (36.9 °C) Oral 90 16 98 %   02/14/25 0020 112/71 97.5 °F (36.4 °C) Oral 100 16 98 %   02/13/25 2021 111/64 97.7 °F (36.5 °C) Oral (!) 104 18 98 %   02/13/25 1602 100/72 97.7 °F (36.5 °C) Oral 88 18 99 %   02/13/25 1542 (!) 103/58 98.1 °F (36.7 °C) Oral 80 16 --   02/13/25 1527 110/65 -- -- 95 -- --   02/13/25 1512 107/61 -- -- 83 -- --   02/13/25 1504 107/63 -- -- 83 -- --   02/13/25 1442 100/60 -- -- 75 -- 100 %   02/13/25 1437 105/68 -- -- 96 -- 100 %   02/13/25 1411 (!) 94/52 -- -- 92 -- 100 %   02/13/25 1406 (!) 101/56 -- -- 88 -- 100 %   02/13/25 1402 119/76 97.7 °F (36.5 °C) Skin 89 18 100 %   02/13/25 1359 109/68 97.7 °F (36.5 °C) Oral 88 16 100 %       Physical Exam:  Gen-A&O, NAD, resting comfortably   CV-regular rate  Resp-non-labored  Abd-nt, nd, fundus firm below the umbilicus  Incision-c/d/i, dressing left in place  -scant blood on pad  Ext-trace edema      Last 3 POC Glucose:   Recent Labs     02/13/25  1152   POCGLU 92      Last 3 CBC:   Recent Labs     02/12/25  1057 02/14/25  0453   WBC 10.9 10.8   RBC 5.10 3.91   HGB 10.8* 8.4*   HCT 35.1 27.1*   MCV 68.8* 69.3*   MCH 21.2* 21.5*   MCHC 30.8 31.0   RDW 15.9* 15.8*    188      Magnesium:   Lab Results   Component Value Date/Time    MG 2.1 07/23/2024 10:20 AM          Assessment/Plan:  29 y.o. POD1 s/p RLTCS/bilateral salpingectomy, uncomplicated. Overall doing well.     Routine post-op care:  -ibuprofen/oxycodone prn pain control  -advance diet as tolerated  -remove

## 2025-02-15 PROCEDURE — APPNB30 APP NON BILLABLE TIME 0-30 MINS: Performed by: MIDWIFE

## 2025-02-15 PROCEDURE — 1120000000 HC RM PRIVATE OB

## 2025-02-15 PROCEDURE — 6370000000 HC RX 637 (ALT 250 FOR IP): Performed by: OBSTETRICS & GYNECOLOGY

## 2025-02-15 RX ADMIN — ACETAMINOPHEN 1000 MG: 500 TABLET ORAL at 17:44

## 2025-02-15 RX ADMIN — IBUPROFEN 800 MG: 400 TABLET, FILM COATED ORAL at 21:55

## 2025-02-15 RX ADMIN — ACETAMINOPHEN 1000 MG: 500 TABLET ORAL at 09:47

## 2025-02-15 RX ADMIN — DOCUSATE SODIUM 100 MG: 100 CAPSULE, LIQUID FILLED ORAL at 04:43

## 2025-02-15 RX ADMIN — DOCUSATE SODIUM 100 MG: 100 CAPSULE, LIQUID FILLED ORAL at 17:44

## 2025-02-15 RX ADMIN — IBUPROFEN 800 MG: 400 TABLET, FILM COATED ORAL at 04:43

## 2025-02-15 RX ADMIN — OXYCODONE 10 MG: 5 TABLET ORAL at 09:47

## 2025-02-15 RX ADMIN — OXYCODONE 10 MG: 5 TABLET ORAL at 04:42

## 2025-02-15 RX ADMIN — OXYCODONE 10 MG: 5 TABLET ORAL at 14:05

## 2025-02-15 RX ADMIN — SIMETHICONE 80 MG: 80 TABLET, CHEWABLE ORAL at 11:36

## 2025-02-15 RX ADMIN — OXYCODONE 5 MG: 5 TABLET ORAL at 21:55

## 2025-02-15 RX ADMIN — ACETAMINOPHEN 1000 MG: 500 TABLET ORAL at 01:48

## 2025-02-15 RX ADMIN — IBUPROFEN 800 MG: 400 TABLET, FILM COATED ORAL at 14:01

## 2025-02-15 ASSESSMENT — PAIN SCALES - GENERAL
PAINLEVEL_OUTOF10: 8
PAINLEVEL_OUTOF10: 5

## 2025-02-15 ASSESSMENT — PAIN DESCRIPTION - LOCATION: LOCATION: INCISION

## 2025-02-15 ASSESSMENT — PAIN DESCRIPTION - DESCRIPTORS: DESCRIPTORS: SORE

## 2025-02-15 ASSESSMENT — PAIN DESCRIPTION - ORIENTATION: ORIENTATION: LOWER

## 2025-02-15 ASSESSMENT — PAIN - FUNCTIONAL ASSESSMENT: PAIN_FUNCTIONAL_ASSESSMENT: ACTIVITIES ARE NOT PREVENTED

## 2025-02-15 NOTE — PROGRESS NOTES
1005:  Updated WHIT Rojas regarding patient's R leg pain/swelling and blisters/drainage at dressing edge.  No new orders at this time.  Will be over to round on patient shortly.

## 2025-02-15 NOTE — LACTATION NOTE
This note was copied from a baby's chart.  Mother continues to nurse baby and then supplement with formula. Mother states that her nipples are feeling sore. Brought mother some lanolin and hydrogel pads. Weight loss for baby is 5.44%. Voids and stools are appropriate.

## 2025-02-15 NOTE — PROGRESS NOTES
Post-Operative Day Number 2 Progress Note    Patient doing well post-op day 2 from  delivery without significant complaints.  Pain controlled on current medication.  Voiding without difficulty, normal lochia.  Tolerating regular diet without nausea or vomiting.  Denies passing gas.  Reports her legs are swollen and the right leg is aching and feels heavy.  The entire leg, from her hip to her ankle is hurting.  She is able to move it and walk with a normal gait.    Vitals:  Patient Vitals for the past 8 hrs:   BP Temp Temp src Pulse Resp SpO2   02/15/25 0947 119/83 98.1 °F (36.7 °C) Oral 99 16 100 %   02/15/25 0440 124/78 97.2 °F (36.2 °C) Oral (!) 101 16 100 %     Temp (24hrs), Av.9 °F (36.6 °C), Min:97.2 °F (36.2 °C), Max:98.2 °F (36.8 °C)        Exam:  Patient without distress               Lungs:  CTA bilaterally               CV:  Regular rate and rhythm               Abdomen soft, nondistended, normal bowel sounds               Uterus: fundus firm at level of umbilicus, nontender.                 Incision: no erythema, exudate or induration.  Weeping blisters present on right side of Tegaderm.  One blister on left side.               Lower extremities are negative for cords or tendernesns; trace swelling.     Labs: No results found for this or any previous visit (from the past 24 hour(s)).    No components found for: \"OBEXTABORH\", \"OBEXTABSCRN\", \"OBEXTRUBELLA\", \"OBEXTGRBS\", \"OBEXTHBSAG\", \"OBEXTHIV\", \"OBEXTRPR\", \"OBEXTGONORR\", \"OBEXTCHLAM\"    Assessment and Plan:  Postoperative day #2,  uncomplicated post- course.   Dressing removed. Incision well approximated with no erythema or exudate.  Will leave open to air.  Appears blisters may be a reaction to the Tegaderm.   Encourage ambulation      - routine postop care  - anticipate discharge in AM

## 2025-02-16 VITALS
OXYGEN SATURATION: 99 % | HEART RATE: 97 BPM | TEMPERATURE: 97.7 F | RESPIRATION RATE: 16 BRPM | SYSTOLIC BLOOD PRESSURE: 116 MMHG | DIASTOLIC BLOOD PRESSURE: 74 MMHG

## 2025-02-16 PROCEDURE — APPNB30 APP NON BILLABLE TIME 0-30 MINS: Performed by: MIDWIFE

## 2025-02-16 PROCEDURE — 6370000000 HC RX 637 (ALT 250 FOR IP): Performed by: OBSTETRICS & GYNECOLOGY

## 2025-02-16 RX ORDER — PNV NO.95/FERROUS FUM/FOLIC AC 28MG-0.8MG
1 TABLET ORAL DAILY
Qty: 90 TABLET | Refills: 2 | Status: SHIPPED | OUTPATIENT
Start: 2025-02-16

## 2025-02-16 RX ORDER — OXYCODONE HYDROCHLORIDE 5 MG/1
5 TABLET ORAL EVERY 4 HOURS PRN
Qty: 10 TABLET | Refills: 0 | Status: SHIPPED | OUTPATIENT
Start: 2025-02-16 | End: 2025-02-19

## 2025-02-16 RX ORDER — IBUPROFEN 800 MG/1
800 TABLET, FILM COATED ORAL EVERY 8 HOURS
Qty: 120 TABLET | Refills: 3 | Status: SHIPPED | OUTPATIENT
Start: 2025-02-16

## 2025-02-16 RX ADMIN — DOCUSATE SODIUM 100 MG: 100 CAPSULE, LIQUID FILLED ORAL at 06:02

## 2025-02-16 RX ADMIN — OXYCODONE 10 MG: 5 TABLET ORAL at 09:44

## 2025-02-16 RX ADMIN — IBUPROFEN 800 MG: 400 TABLET, FILM COATED ORAL at 06:02

## 2025-02-16 RX ADMIN — ACETAMINOPHEN 1000 MG: 500 TABLET ORAL at 06:02

## 2025-02-16 ASSESSMENT — PAIN SCALES - GENERAL: PAINLEVEL_OUTOF10: 8

## 2025-02-16 ASSESSMENT — PAIN DESCRIPTION - DESCRIPTORS: DESCRIPTORS: SORE

## 2025-02-16 ASSESSMENT — PAIN DESCRIPTION - ORIENTATION: ORIENTATION: LOWER

## 2025-02-16 ASSESSMENT — PAIN DESCRIPTION - LOCATION: LOCATION: INCISION

## 2025-02-16 ASSESSMENT — PAIN - FUNCTIONAL ASSESSMENT: PAIN_FUNCTIONAL_ASSESSMENT: ACTIVITIES ARE NOT PREVENTED

## 2025-02-16 NOTE — PROGRESS NOTES
Post-Operative Day Number 3 Progress/Discharge Note    Patient doing well post-op day 3 from  delivery without significant complaints.  Pain controlled on current medication.  Voiding without difficulty, normal lochia.  Tolerating regular diet without nausea or vomiting.  +flatus    Vitals:  Patient Vitals for the past 8 hrs:   BP Temp Temp src Pulse Resp SpO2   25 0927 116/74 97.7 °F (36.5 °C) Oral 97 16 99 %   25 0550 122/79 98.2 °F (36.8 °C) Oral 95 16 99 %     Temp (24hrs), Av.7 °F (36.5 °C), Min:97.5 °F (36.4 °C), Max:98.2 °F (36.8 °C)        Exam:  Patient without distress.               Lungs:  CTA bilaterally               CV:  Regular rate and rhythm               Abdomen soft, nondistended, normal bowel sounds               Uterus: fundus firm at level of umbilicus, nontender.                 Incision:  Edges well approximated, so erythema or exudate, Small blisters still present on right side.               Lower extremities are negative for cords or tenderness; +1 swelling.    Labs: No results found for this or any previous visit (from the past 24 hour(s)).    No components found for: \"OBEXTABORH\", \"OBEXTABSCRN\", \"OBEXTRUBELLA\", \"OBEXTGRBS\", \"OBEXTHBSAG\", \"OBEXTHIV\", \"OBEXTRPR\", \"OBEXTGONORR\", \"OBEXTCHLAM\"    Assessment and Plan:  Postoperative day #3, S/P C/S.  Doing well.   - discharge to home   - [place]   - f/u in office 6wks, sooner prn.

## 2025-02-16 NOTE — DISCHARGE SUMMARY
Discharge Summary    Date: 2025  Patient Name: Bri Trent    YOB: 1995     Age: 29 y.o.    Admit Date: 2025  Discharge Date: 2025  Discharge Condition: Good    Admission Diagnosis  Delivery by  section using transverse incision of lower segment of uterus [O82];Gestational diabetes [O24.419]      Discharge Diagnosis  Principal Problem:    Gestational diabetes  Active Problems:    Status post bilateral salpingectomy    Nausea and vomiting  Resolved Problems:    * No resolved hospital problems. *      Hospital Stay  Narrative of Hospital Course:  C/S for LFI    Consultants:  IP CONSULT TO LACTATION  IP CONSULT TO CASE MANAGEMENT    Surgeries/procedures Performed:      Treatments:    Analgesia        Discharge Plan/Disposition:  Home    Hospital/Incidental Findings Requiring Follow Up:    Patient Instructions:    Diet: Regular Diet    Activity:No Driving While on Analgesics and No Lifting, Driving or Strenuous Excercise  For number of days (if applicable): 6      Other Instructions: Follow up with Dr. Mosquera in 1 week for incision check    Provider Follow-Up:   No follow-ups on file.     Significant Diagnostic Studies:    Recent Labs:  Admission on 2025  POC Glucose                                   Date: 2025  Value: 92          Ref range: 65 - 117 mg/dL     Status: Final                Comment: (NOTE)  The FDA has indicated that no capillary point of care blood glucose  monitoring systems are approved for use in \"critically ill\" patients,  however they have not defined this population. The College of  [employer] has recommended that these devices should not  be used in cases such as severe hypotension, dehydration, shock, and  hyperglycemic-hyperosmolar state, amongst others.  Venous or arterial  collection is the recommended specimen for testing these patients.    Performed by:                                 Date: 2025  Value: BROWN  DAVID

## 2025-03-27 ENCOUNTER — OFFICE VISIT (OUTPATIENT)
Age: 30
End: 2025-03-27
Payer: MEDICAID

## 2025-03-27 VITALS
DIASTOLIC BLOOD PRESSURE: 82 MMHG | HEIGHT: 65 IN | HEART RATE: 76 BPM | WEIGHT: 189 LBS | OXYGEN SATURATION: 100 % | BODY MASS INDEX: 31.49 KG/M2 | SYSTOLIC BLOOD PRESSURE: 118 MMHG

## 2025-03-27 DIAGNOSIS — R07.9 CHEST PAIN, UNSPECIFIED TYPE: Primary | ICD-10-CM

## 2025-03-27 PROCEDURE — 99214 OFFICE O/P EST MOD 30 MIN: CPT

## 2025-03-27 ASSESSMENT — PATIENT HEALTH QUESTIONNAIRE - PHQ9
SUM OF ALL RESPONSES TO PHQ QUESTIONS 1-9: 0
2. FEELING DOWN, DEPRESSED OR HOPELESS: NOT AT ALL
SUM OF ALL RESPONSES TO PHQ QUESTIONS 1-9: 0
SUM OF ALL RESPONSES TO PHQ QUESTIONS 1-9: 0
1. LITTLE INTEREST OR PLEASURE IN DOING THINGS: NOT AT ALL
SUM OF ALL RESPONSES TO PHQ QUESTIONS 1-9: 0

## 2025-03-27 NOTE — PROGRESS NOTES
1. Have you been to the ER, urgent care clinic since your last visit?  Hospitalized since your last visit? Chest Pain [facility] 1/2025    2. Have you seen or consulted any other health care providers outside of the [facility] since your last visit?  Include any pap smears or colon screening. No

## 2025-03-27 NOTE — ASSESSMENT & PLAN NOTE
-seen in hospital at 35 weeks for gestational diabetes and reported chest pain ongoing for 2 months that lasted approximately 15 mintues and happened with and without activity. It was not reproducible. It was described as tightness/pressure and waxed/waned. She was SOB with exertion.     EKG at that time with NSR borderlilne LVH and no ischemic changes  Troponin <4   Echo 2/625 EF 55-60% normal wall motion    Here today for follow-up and this has resolved with no new symptoms    BP/HR good today.     She has no personal of family history of cardiac disease.

## 2025-05-26 ENCOUNTER — APPOINTMENT (OUTPATIENT)
Facility: HOSPITAL | Age: 30
End: 2025-05-26
Payer: MEDICAID

## 2025-05-26 ENCOUNTER — HOSPITAL ENCOUNTER (EMERGENCY)
Facility: HOSPITAL | Age: 30
Discharge: HOME | End: 2025-05-26
Attending: STUDENT IN AN ORGANIZED HEALTH CARE EDUCATION/TRAINING PROGRAM
Payer: MEDICAID

## 2025-05-26 VITALS
HEART RATE: 86 BPM | BODY MASS INDEX: 33.2 KG/M2 | WEIGHT: 199.3 LBS | RESPIRATION RATE: 18 BRPM | SYSTOLIC BLOOD PRESSURE: 115 MMHG | DIASTOLIC BLOOD PRESSURE: 68 MMHG | TEMPERATURE: 99.1 F | HEIGHT: 65 IN | OXYGEN SATURATION: 100 %

## 2025-05-26 DIAGNOSIS — M54.41 ACUTE RIGHT-SIDED LOW BACK PAIN WITH RIGHT-SIDED SCIATICA: Primary | ICD-10-CM

## 2025-05-26 PROCEDURE — 6360000002 HC RX W HCPCS

## 2025-05-26 PROCEDURE — 96372 THER/PROPH/DIAG INJ SC/IM: CPT

## 2025-05-26 PROCEDURE — 99284 EMERGENCY DEPT VISIT MOD MDM: CPT

## 2025-05-26 PROCEDURE — 6370000000 HC RX 637 (ALT 250 FOR IP)

## 2025-05-26 PROCEDURE — 72100 X-RAY EXAM L-S SPINE 2/3 VWS: CPT

## 2025-05-26 RX ORDER — METHYLPREDNISOLONE 4 MG/1
TABLET ORAL
Qty: 1 KIT | Refills: 0 | Status: SHIPPED | OUTPATIENT
Start: 2025-05-26

## 2025-05-26 RX ORDER — METHOCARBAMOL 750 MG/1
750 TABLET, FILM COATED ORAL 4 TIMES DAILY
Qty: 20 TABLET | Refills: 0 | Status: SHIPPED | OUTPATIENT
Start: 2025-05-26 | End: 2025-05-31

## 2025-05-26 RX ORDER — METHOCARBAMOL 750 MG/1
750 TABLET, FILM COATED ORAL
Status: COMPLETED | OUTPATIENT
Start: 2025-05-26 | End: 2025-05-26

## 2025-05-26 RX ORDER — LIDOCAINE 50 MG/G
1 PATCH TOPICAL DAILY
Qty: 7 PATCH | Refills: 0 | Status: SHIPPED | OUTPATIENT
Start: 2025-05-26 | End: 2025-06-02

## 2025-05-26 RX ORDER — KETOROLAC TROMETHAMINE 30 MG/ML
15 INJECTION, SOLUTION INTRAMUSCULAR; INTRAVENOUS
Status: COMPLETED | OUTPATIENT
Start: 2025-05-26 | End: 2025-05-26

## 2025-05-26 RX ORDER — LIDOCAINE 4 G/G
1 PATCH TOPICAL
Status: DISCONTINUED | OUTPATIENT
Start: 2025-05-26 | End: 2025-05-26 | Stop reason: HOSPADM

## 2025-05-26 RX ADMIN — METHOCARBAMOL 750 MG: 750 TABLET ORAL at 17:20

## 2025-05-26 RX ADMIN — KETOROLAC TROMETHAMINE 15 MG: 30 INJECTION, SOLUTION INTRAMUSCULAR; INTRAVENOUS at 17:18

## 2025-05-26 ASSESSMENT — PAIN - FUNCTIONAL ASSESSMENT
PAIN_FUNCTIONAL_ASSESSMENT: ACTIVITIES ARE NOT PREVENTED
PAIN_FUNCTIONAL_ASSESSMENT: 0-10
PAIN_FUNCTIONAL_ASSESSMENT: ACTIVITIES ARE NOT PREVENTED

## 2025-05-26 ASSESSMENT — PAIN DESCRIPTION - LOCATION
LOCATION: BACK
LOCATION: BACK

## 2025-05-26 ASSESSMENT — PAIN SCALES - GENERAL
PAINLEVEL_OUTOF10: 3
PAINLEVEL_OUTOF10: 10

## 2025-05-26 ASSESSMENT — PAIN DESCRIPTION - DESCRIPTORS
DESCRIPTORS: ACHING
DESCRIPTORS: ACHING

## 2025-05-26 ASSESSMENT — PAIN DESCRIPTION - FREQUENCY: FREQUENCY: CONTINUOUS

## 2025-05-26 ASSESSMENT — PAIN DESCRIPTION - ORIENTATION
ORIENTATION: LOWER
ORIENTATION: RIGHT

## 2025-05-26 ASSESSMENT — PAIN DESCRIPTION - PAIN TYPE: TYPE: ACUTE PAIN

## 2025-05-26 NOTE — ED PROVIDER NOTES
ALCOHOL SWABS (ALCOHOL PREP) PADS    Please use to four times a day to clean finger before checking blood sugar. Substitute for insurance preferred brand    BLOOD GLUCOSE MONITORING SUPPL (ONE TOUCH ULTRA 2) W/DEVICE KIT    Please use to check blood sugar four times a day. Substitute for insurance preferred brand    IBUPROFEN (ADVIL;MOTRIN) 800 MG TABLET    Take 1 tablet by mouth in the morning and 1 tablet at noon and 1 tablet in the evening.    INSULIN PEN NEEDLE 32G X 6 MM MISC    Please use to administer insulin daily.    LANCETS MISC    Use to check blood glucose 4 times a day. Please substitute for insurance preferred brand    PRENATAL MULTIVIT-MIN-FE-FA (PRENATAL 1 + IRON PO)    Take by mouth    PRENATAL VIT-FE FUMARATE-FA (PRENATAL VITAMIN) 27-0.8 MG TABS    Take 1 tablet by mouth daily       ALLERGIES     Patient has no known allergies.    FAMILY HISTORY     No family history on file.       SOCIAL HISTORY       Social History     Socioeconomic History    Marital status:    Tobacco Use    Smoking status: Never    Smokeless tobacco: Never   Vaping Use    Vaping status: Never Used   Substance and Sexual Activity    Alcohol use: Never    Drug use: Never    Sexual activity: Yes     Partners: Male     Social Drivers of Health     Food Insecurity: No Food Insecurity (2/13/2025)    Hunger Vital Sign     Worried About Running Out of Food in the Last Year: Never true     Ran Out of Food in the Last Year: Never true   Transportation Needs: No Transportation Needs (2/13/2025)    PRAPARE - Transportation     Lack of Transportation (Medical): No     Lack of Transportation (Non-Medical): No   Housing Stability: Low Risk  (2/13/2025)    Housing Stability Vital Sign     Unable to Pay for Housing in the Last Year: No     Number of Times Moved in the Last Year: 0     Homeless in the Last Year: No           PHYSICAL EXAM    (up to 7 for level 4, 8 or more for level 5)     ED Triage Vitals [05/26/25 1638]   BP Systolic

## 2025-05-26 NOTE — ED TRIAGE NOTES
Patient arrives to ED reports lower back pain radiating down legs for 2 days, repots pain started after lifting something heavy.

## (undated) DEVICE — Z DISCONTINUED PACK PROCEDURE SURG C SECT KT SMH

## (undated) DEVICE — PENCIL SMK EVAC 10 FT BLADE ELECTRD ROCKER FOR TELSCP

## (undated) DEVICE — SPROTTE TRAY 24G X 4'' (103MM) NEEDLE WITH 40MM INTRODUCER (SPINAL)

## (undated) DEVICE — GLOVE SURG SZ 65 THK91MIL LTX FREE SYN POLYISOPRENE

## (undated) DEVICE — DISSECTOR ENDOSCP L21CM TIP CURVATURE 40DEG FN CRV JAW VES

## (undated) DEVICE — KENDALL SCD EXPRESS SLEEVES, KNEE LENGTH, MEDIUM: Brand: KENDALL SCD

## (undated) DEVICE — SUTURE MONOCRYL SZ 4-0 L27IN ABSRB UD L24MM PS-1 3/8 CIR PRIM Y935H

## (undated) DEVICE — CATHETER URIN 16FR 30CC BLLN 2 W F LUBRI-SIL IC

## (undated) DEVICE — 3000CC GUARDIAN II: Brand: GUARDIAN

## (undated) DEVICE — COVERALLS PROTCT 2XL WHT SMS ANTISTATIC PREM KNIT CUF FULL

## (undated) DEVICE — MEDI-VAC NON-CONDUCTIVE SUCTION TUBING: Brand: CARDINAL HEALTH

## (undated) DEVICE — DRESSING SIL W4XL5IN ANTIBACT GELLING FBR CYTOFORM

## (undated) DEVICE — SUTURE VICRYL SZ 1 L36IN ABSRB VLT L36MM CT-1 1/2 CIR J347H

## (undated) DEVICE — SOLUTION IRRIG 1000ML 0.9% SOD CHL USP POUR PLAS BTL

## (undated) DEVICE — ROYALSILK SURGICAL GOWN, L: Brand: CONVERTORS

## (undated) DEVICE — APPLICATOR MEDICATED 26 CC SOLUTION HI LT ORNG CHLORAPREP

## (undated) DEVICE — SUTURE VICRYL SZ 0 L36IN ABSRB UD L40MM CT 1/2 CIR TAPERPOINT J958H

## (undated) DEVICE — ATTACHMENT SMK 3/8INX10FT VALLEYLAB

## (undated) DEVICE — SOLIDIFIER FLUID 1.3 OZ GEL 1200CC NS

## (undated) DEVICE — SOLUTION IRRIG 1000ML STRL H2O USP PLAS POUR BTL

## (undated) DEVICE — SUTURE VICRYL + SZ 2-0 L27IN ABSRB VLT L70MM XLH 1/2 CIR VCP581G

## (undated) DEVICE — GLOVE SURG SZ 65 L12IN FNGR THK79MIL GRN LTX FREE

## (undated) DEVICE — DEVON™ KNEE AND BODY STRAP 60" X 3" (1.5 M X 7.6 CM): Brand: DEVON

## (undated) DEVICE — AGENT HEMSTAT 3GM PURIFIED PLNT STARCH PWD ABSRB ARISTA AH

## (undated) DEVICE — ATTACHMENT SMK EVAC 15 FTX3/8 IN

## (undated) DEVICE — ELECTRODE PT RET AD L9FT HI MOIST COND ADH HYDRGEL CORDED

## (undated) DEVICE — SPONGE LAP W18XL18IN WHT COT 4 PLY FLD STRUNG RADPQ DISP ST 2 PER PACK

## (undated) DEVICE — AGENT HEMSTAT W3XL4IN OXIDIZED REGENERATED CELOS ABSRB FOR